# Patient Record
Sex: MALE | Race: WHITE | NOT HISPANIC OR LATINO | Employment: FULL TIME | URBAN - METROPOLITAN AREA
[De-identification: names, ages, dates, MRNs, and addresses within clinical notes are randomized per-mention and may not be internally consistent; named-entity substitution may affect disease eponyms.]

---
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
## 2021-01-22 ENCOUNTER — OFFICE VISIT (OUTPATIENT)
Dept: URGENT CARE | Facility: CLINIC | Age: 21
End: 2021-01-22
Payer: OTHER GOVERNMENT

## 2021-01-22 ENCOUNTER — HOSPITAL ENCOUNTER (OUTPATIENT)
Facility: MEDICAL CENTER | Age: 21
End: 2021-01-22
Attending: NURSE PRACTITIONER
Payer: COMMERCIAL

## 2021-01-22 VITALS
HEART RATE: 90 BPM | RESPIRATION RATE: 16 BRPM | BODY MASS INDEX: 23.16 KG/M2 | SYSTOLIC BLOOD PRESSURE: 114 MMHG | OXYGEN SATURATION: 96 % | TEMPERATURE: 98.1 F | WEIGHT: 139 LBS | DIASTOLIC BLOOD PRESSURE: 70 MMHG | HEIGHT: 65 IN

## 2021-01-22 DIAGNOSIS — J02.9 PHARYNGITIS, UNSPECIFIED ETIOLOGY: ICD-10-CM

## 2021-01-22 DIAGNOSIS — J02.0 STREP PHARYNGITIS: ICD-10-CM

## 2021-01-22 DIAGNOSIS — Z20.822 SUSPECTED COVID-19 VIRUS INFECTION: ICD-10-CM

## 2021-01-22 LAB
INT CON NEG: NORMAL
INT CON POS: NORMAL
S PYO AG THROAT QL: POSITIVE

## 2021-01-22 PROCEDURE — U0003 INFECTIOUS AGENT DETECTION BY NUCLEIC ACID (DNA OR RNA); SEVERE ACUTE RESPIRATORY SYNDROME CORONAVIRUS 2 (SARS-COV-2) (CORONAVIRUS DISEASE [COVID-19]), AMPLIFIED PROBE TECHNIQUE, MAKING USE OF HIGH THROUGHPUT TECHNOLOGIES AS DESCRIBED BY CMS-2020-01-R: HCPCS

## 2021-01-22 PROCEDURE — 87880 STREP A ASSAY W/OPTIC: CPT | Performed by: NURSE PRACTITIONER

## 2021-01-22 PROCEDURE — U0005 INFEC AGEN DETEC AMPLI PROBE: HCPCS

## 2021-01-22 PROCEDURE — 99214 OFFICE O/P EST MOD 30 MIN: CPT | Performed by: NURSE PRACTITIONER

## 2021-01-22 RX ORDER — AMOXICILLIN 500 MG/1
500 CAPSULE ORAL 2 TIMES DAILY
Qty: 20 CAP | Refills: 0 | Status: SHIPPED | OUTPATIENT
Start: 2021-01-22 | End: 2021-02-01

## 2021-01-22 ASSESSMENT — ENCOUNTER SYMPTOMS
HEADACHES: 1
VOMITING: 0
NAUSEA: 0
TROUBLE SWALLOWING: 0
SHORTNESS OF BREATH: 1
FEVER: 0
DIARRHEA: 1
ABDOMINAL PAIN: 0
CHILLS: 1
SORE THROAT: 1
MYALGIAS: 1
COUGH: 1

## 2021-01-22 NOTE — PROGRESS NOTES
Subjective:     Gene Villanueva is a 20 y.o. male who presents for Pharyngitis (x 5 days, sore throat, post nasal drip and chills)      Pharyngitis   This is a new problem. The current episode started in the past 7 days (5 days ago he developed a sore throat, chills and headache.). The problem has been unchanged. Neither side of throat is experiencing more pain than the other. There has been no fever. The pain is at a severity of 3/10. Associated symptoms include congestion, coughing, diarrhea, headaches and shortness of breath. Pertinent negatives include no abdominal pain, ear pain, trouble swallowing or vomiting. Treatments tried: Marijuana. The treatment provided moderate relief.       Review of Systems   Constitutional: Positive for chills and malaise/fatigue. Negative for fever.   HENT: Positive for congestion and sore throat. Negative for ear pain and trouble swallowing.    Respiratory: Positive for cough and shortness of breath.    Gastrointestinal: Positive for diarrhea. Negative for abdominal pain, nausea and vomiting.   Musculoskeletal: Positive for myalgias.   Neurological: Positive for headaches.       PMH:   Past Medical History:   Diagnosis Date   • Infectious disease 2/2014    Osteomyelitis of right shoulder     ALLERGIES:   Allergies   Allergen Reactions   • Other Environmental      seasonal     SURGHX:   Past Surgical History:   Procedure Laterality Date   • EXTENSOR TENDON REPAIR  4/4/2014    Performed by Vitor Castro M.D. at [facility]     SOCHX:   Social History     Socioeconomic History   • Marital status: Single     Spouse name: Not on file   • Number of children: Not on file   • Years of education: Not on file   • Highest education level: Not on file   Occupational History   • Not on file   Social Needs   • Financial resource strain: Not on file   • Food insecurity     Worry: Not on file     Inability: Not on file   • Transportation needs     Medical: Not on file     Non-medical:  "Not on file   Tobacco Use   • Smoking status: Never Smoker   • Smokeless tobacco: Never Used   Substance and Sexual Activity   • Alcohol use: Not on file   • Drug use: Not on file   • Sexual activity: Not on file   Lifestyle   • Physical activity     Days per week: Not on file     Minutes per session: Not on file   • Stress: Not on file   Relationships   • Social connections     Talks on phone: Not on file     Gets together: Not on file     Attends [employer] service: Not on file     Active member of club or organization: Not on file     Attends meetings of clubs or organizations: Not on file     Relationship status: Not on file   • Intimate partner violence     Fear of current or ex partner: Not on file     Emotionally abused: Not on file     Physically abused: Not on file     Forced sexual activity: Not on file   Other Topics Concern   • Not on file   Social History Narrative   • Not on file     FH: No family history on file.      Objective:   /70 (BP Location: Left arm, Patient Position: Sitting, BP Cuff Size: Adult)   Pulse 90   Temp 36.7 °C (98.1 °F) (Temporal)   Resp 16   Ht 1.651 m (5' 5\")   Wt 63 kg (139 lb)   SpO2 96%   BMI 23.13 kg/m²     Physical Exam  Vitals signs and nursing note reviewed.   Constitutional:       General: He is not in acute distress.     Appearance: Normal appearance. He is normal weight. He is ill-appearing. He is not toxic-appearing.   HENT:      Head: Normocephalic and atraumatic.      Right Ear: Tympanic membrane, ear canal and external ear normal. There is no impacted cerumen.      Left Ear: Tympanic membrane, ear canal and external ear normal. There is no impacted cerumen.      Nose: No congestion or rhinorrhea.      Mouth/Throat:      Mouth: Mucous membranes are moist.      Pharynx: Posterior oropharyngeal erythema present. No oropharyngeal exudate.   Eyes:      General:         Right eye: No discharge.         Left eye: No discharge.      Extraocular Movements: " Extraocular movements intact.      Pupils: Pupils are equal, round, and reactive to light.   Neck:      Musculoskeletal: Normal range of motion and neck supple. Muscular tenderness present. No neck rigidity.      Vascular: No carotid bruit.   Cardiovascular:      Rate and Rhythm: Normal rate and regular rhythm.      Pulses: Normal pulses.      Heart sounds: Normal heart sounds.   Pulmonary:      Effort: Pulmonary effort is normal. No respiratory distress.      Breath sounds: Normal breath sounds. No stridor. No wheezing, rhonchi or rales.   Chest:      Chest wall: No tenderness.   Abdominal:      General: Abdomen is flat. Bowel sounds are normal.      Palpations: Abdomen is soft.      Tenderness: There is no abdominal tenderness. There is no right CVA tenderness or left CVA tenderness.   Musculoskeletal: Normal range of motion.   Lymphadenopathy:      Cervical: Cervical adenopathy present.   Skin:     General: Skin is warm and dry.      Capillary Refill: Capillary refill takes less than 2 seconds.   Neurological:      General: No focal deficit present.      Mental Status: He is alert and oriented to person, place, and time. Mental status is at baseline.   Psychiatric:         Mood and Affect: Mood normal.         Behavior: Behavior normal.         Thought Content: Thought content normal.         Judgment: Judgment normal.       POCT strep: positive A  Assessment/Plan:   Assessment    1. Strep pharyngitis  amoxicillin (AMOXIL) 500 MG Cap   2. Suspected COVID-19 virus infection  COVID/SARS CoV-2 PCR   3. Pharyngitis, unspecified etiology  POCT Rapid Strep A   He will be treated for his strep infection with amoxicillin.  Pt was tested for COVID today. I will call with results. Upon entering the room PPE was worn throughout the duration of his visit for both provider and patient. Mask of patient briefly removed for examination of oropharynx. PT was educated to remain in self isolation for at least 10 days from onset of  symptoms followed by an additional 24-hour period of being symptom-free without the use of symptom altering medication.  We discussed supportive measures including humidifier, warm salt water gargles, over-the-counter Cepacol throat lozenges, rest  and increased fluids. Pt was encouraged to seek treatment back in the ER or urgent care for worsening symptoms,  fever greater than 100.5, wheezes or shortness of breath.  Time spent evaluating this patient was at least 30 minutes and includes preparing for visit, counseling/education, exam and evaluation, obtaining history, independent interpretation and ordering labs/tests/procedures.       AVS handout given and reviewed with patient. Pt educated on red flags and when to seek treatment back in ER or UC.

## 2021-01-23 DIAGNOSIS — Z20.822 SUSPECTED COVID-19 VIRUS INFECTION: ICD-10-CM

## 2021-01-23 LAB
COVID ORDER STATUS COVID19: NORMAL
SARS-COV-2 RNA RESP QL NAA+PROBE: NOTDETECTED
SPECIMEN SOURCE: NORMAL

## 2022-01-11 ENCOUNTER — OFFICE VISIT (OUTPATIENT)
Dept: URGENT CARE | Facility: CLINIC | Age: 22
End: 2022-01-11
Payer: COMMERCIAL

## 2022-01-11 ENCOUNTER — HOSPITAL ENCOUNTER (OUTPATIENT)
Facility: MEDICAL CENTER | Age: 22
End: 2022-01-11
Attending: PHYSICIAN ASSISTANT
Payer: COMMERCIAL

## 2022-01-11 VITALS
OXYGEN SATURATION: 98 % | HEIGHT: 68 IN | HEART RATE: 90 BPM | DIASTOLIC BLOOD PRESSURE: 76 MMHG | RESPIRATION RATE: 18 BRPM | BODY MASS INDEX: 18.94 KG/M2 | SYSTOLIC BLOOD PRESSURE: 108 MMHG | TEMPERATURE: 100.1 F | WEIGHT: 125 LBS

## 2022-01-11 DIAGNOSIS — R50.9 FEVER, UNSPECIFIED FEVER CAUSE: ICD-10-CM

## 2022-01-11 DIAGNOSIS — Z20.822 SUSPECTED COVID-19 VIRUS INFECTION: ICD-10-CM

## 2022-01-11 PROCEDURE — 99213 OFFICE O/P EST LOW 20 MIN: CPT | Performed by: PHYSICIAN ASSISTANT

## 2022-01-11 PROCEDURE — 0240U HCHG SARS-COV-2 COVID-19 NFCT DS RESP RNA 3 TRGT MIC: CPT

## 2022-01-11 ASSESSMENT — ENCOUNTER SYMPTOMS
SORE THROAT: 1
EYE REDNESS: 0
NECK PAIN: 0
COUGH: 0
VOMITING: 0
CHILLS: 1
BODY ACHES: 1
EYE DISCHARGE: 0
WHEEZING: 0
DIARRHEA: 0
DIZZINESS: 0
MYALGIAS: 1
HEADACHES: 1
FEVER: 1

## 2022-01-11 NOTE — PROGRESS NOTES
"Subjective     Gene Villanueva is a 21 y.o. male who presents with Coronavirus Screening (would like a covid-19 test ), Fatigue (x2 days, extremely tired ), and Headache              Patient is a 21-year-old male who presents to urgent care with body aches, fatigue, fevers, headache for the last 2 to 3 days.  Patient does report a mild sore throat at this time however this is minimal.  Patient denies any COVID-19 exposure that he is aware of.  He denies COVID-19 vaccination as well.  Patient did have improvement of body aches along with headache with ibuprofen along with Tylenol last night.    Headache   Associated symptoms include a fever and a sore throat. Pertinent negatives include no coughing, dizziness, ear pain, eye redness, neck pain or vomiting.   Generalized Body Aches  This is a new problem. Episode onset: 2-3 days ago. The problem occurs constantly. The problem has been waxing and waning. Associated symptoms include chills, congestion, a fever, headaches, myalgias and a sore throat. Pertinent negatives include no coughing, neck pain, rash or vomiting. Nothing aggravates the symptoms. He has tried acetaminophen and NSAIDs for the symptoms. The treatment provided mild relief.       Review of Systems   Constitutional: Positive for chills, fever and malaise/fatigue.   HENT: Positive for congestion and sore throat. Negative for ear discharge and ear pain.    Eyes: Negative for discharge and redness.   Respiratory: Negative for cough and wheezing.    Gastrointestinal: Negative for diarrhea and vomiting.   Genitourinary: Negative for dysuria and urgency.   Musculoskeletal: Positive for myalgias. Negative for neck pain.   Skin: Negative for itching and rash.   Neurological: Positive for headaches. Negative for dizziness.              Objective     /76   Pulse 90   Temp 37.8 °C (100.1 °F) (Temporal)   Resp 18   Ht 1.727 m (5' 8\")   Wt 56.7 kg (125 lb)   SpO2 98%   BMI 19.01 kg/m²    PMH:  has a past " medical history of Infectious disease (2/2014).  MEDS: Reviewed .   ALLERGIES:   Allergies   Allergen Reactions   • Other Environmental      seasonal     SURGHX:   Past Surgical History:   Procedure Laterality Date   • EXTENSOR TENDON REPAIR  4/4/2014    Performed by Vitor Castro M.D. at [facility]     SOCHX:  reports that he has never smoked. He has never used smokeless tobacco. He reports current alcohol use. He reports current drug use. Drugs: Marijuana and Inhaled.  FH: Family history was reviewed, no pertinent findings to report    Physical Exam  Vitals reviewed.   Constitutional:       General: He is not in acute distress.     Appearance: He is well-developed.   HENT:      Head: Normocephalic and atraumatic.      Right Ear: External ear normal.      Left Ear: External ear normal.   Eyes:      Conjunctiva/sclera: Conjunctivae normal.      Pupils: Pupils are equal, round, and reactive to light.   Neck:      Trachea: No tracheal deviation.   Cardiovascular:      Rate and Rhythm: Normal rate.   Pulmonary:      Effort: Pulmonary effort is normal.   Musculoskeletal:         General: Normal range of motion.      Cervical back: Normal range of motion and neck supple.   Skin:     General: Skin is warm.      Findings: No rash.      Comments: No rash to area exposed during the visit today.    Neurological:      Mental Status: He is alert and oriented to person, place, and time.      Coordination: Coordination normal.   Psychiatric:         Behavior: Behavior normal.         Thought Content: Thought content normal.         Judgment: Judgment normal.                             Assessment & Plan        1. Suspected COVID-19 virus infection  - CoV-2 and Flu A/B by PCR (Cepheid); Future    2. Fever, unspecified fever cause            Appropriate PPE worn at all times by provider.   Pt. Had face mask on throughout entirety of the visit other than oropharyngeal examination today.     Testing performed for  COVID-19.  This patient is with noted fevers as well as send off for influenza PCR as well.  Discussed continuation of antipyretic medication.  No evidence of secondary bacterial infection.  Patient currently without indication of need for higher level of care.    Reviewed with patient/guardian that if they do test positive they will be contacted by their local health department regarding return to work/school protocols.  Results will also be [facility] or called to the patient/guardian directly. Discussed isolation/quarantine recommendations.  Encouraged mask use, frequent handwashing, wiping down hard surfaces, etc.    Patient and/or guardian given precautionary s/sx that mandate immediate follow up and evaluation in the ED. Advised of risks of not doing so.  Side effects of OTC or prescribed medications discussed.   DDX, Supportive care, and indications for immediate follow-up discussed.  Instructed to return to clinic or nearest emergency department if we are not available for any change in condition, further concerns, or worsening of symptoms.    The patient and/or guardian demonstrated a good understanding and agreed with the treatment plan.    Please note that this dictation was created using voice recognition software. I have made every reasonable attempt to correct obvious errors, but I expect that there are errors of grammar and possibly content that I did not discover before finalizing the note.

## 2022-01-12 LAB
FLUAV RNA SPEC QL NAA+PROBE: NEGATIVE
FLUBV RNA SPEC QL NAA+PROBE: NEGATIVE
SARS-COV-2 RNA RESP QL NAA+PROBE: DETECTED
SPECIMEN SOURCE: ABNORMAL

## 2022-01-13 ENCOUNTER — TELEPHONE (OUTPATIENT)
Dept: URGENT CARE | Facility: CLINIC | Age: 22
End: 2022-01-13

## 2022-01-13 NOTE — TELEPHONE ENCOUNTER
Called pt to inform of POSITIVE COVID, clarified current CDC guidelines. He had no further questions at this time.

## 2022-05-13 ENCOUNTER — OFFICE VISIT (OUTPATIENT)
Dept: URGENT CARE | Facility: CLINIC | Age: 22
End: 2022-05-13
Payer: COMMERCIAL

## 2022-05-13 VITALS
TEMPERATURE: 98.2 F | BODY MASS INDEX: 21.04 KG/M2 | HEART RATE: 99 BPM | HEIGHT: 68 IN | SYSTOLIC BLOOD PRESSURE: 110 MMHG | OXYGEN SATURATION: 97 % | WEIGHT: 138.8 LBS | RESPIRATION RATE: 16 BRPM | DIASTOLIC BLOOD PRESSURE: 80 MMHG

## 2022-05-13 DIAGNOSIS — K52.9 GASTROENTERITIS: ICD-10-CM

## 2022-05-13 DIAGNOSIS — R68.89 FLU-LIKE SYMPTOMS: ICD-10-CM

## 2022-05-13 LAB
FLUAV+FLUBV AG SPEC QL IA: NEGATIVE
INT CON NEG: NEGATIVE
INT CON POS: POSITIVE

## 2022-05-13 PROCEDURE — 99213 OFFICE O/P EST LOW 20 MIN: CPT | Performed by: PHYSICIAN ASSISTANT

## 2022-05-13 PROCEDURE — 87804 INFLUENZA ASSAY W/OPTIC: CPT | Performed by: PHYSICIAN ASSISTANT

## 2022-05-13 RX ORDER — ONDANSETRON 4 MG/1
4 TABLET, ORALLY DISINTEGRATING ORAL EVERY 8 HOURS PRN
Qty: 10 TABLET | Refills: 0 | Status: SHIPPED
Start: 2022-05-13 | End: 2022-07-19

## 2022-05-13 ASSESSMENT — ENCOUNTER SYMPTOMS
DIARRHEA: 1
BLOOD IN STOOL: 0
MYALGIAS: 1
ABDOMINAL PAIN: 1
VOMITING: 1
CONSTIPATION: 0
FEVER: 0
CHILLS: 1
NAUSEA: 1

## 2022-05-13 NOTE — LETTER
May 16, 2022         Patient: Gene Villanueva   YOB: 2000   Date of Visit: 5/13/2022           To Whom it May Concern:    Gene Villanueva was seen in my clinic on 5/13/2022. Please excuse patient's absence.     If you have any questions or concerns, please don't hesitate to call.        Sincerely,           New Myles P.A.-C.  Electronically Signed

## 2022-05-13 NOTE — PROGRESS NOTES
"  Subjective:   Gene Villanueva is a 21 y.o. male who presents today with   Chief Complaint   Patient presents with   • Vomiting     Started this morning x 5 times   • Diarrhea     Started today x 3    • Chills            Vomiting  This is a new problem. The current episode started today. The problem occurs constantly. The problem has been gradually improving. Associated symptoms include abdominal pain, chills, myalgias, nausea and vomiting. Pertinent negatives include no fever. Associated symptoms comments: Diarrhea. He has tried acetaminophen for the symptoms. The treatment provided mild relief.   Symptoms just started today.  Patient states he did eat a peanut butter protein shake around 1 AM and believes this could have caused the symptoms as well.  PMH:  has a past medical history of Infectious disease (2/2014).  MEDS: No current outpatient medications on file.  ALLERGIES:   Allergies   Allergen Reactions   • Other Environmental      seasonal     SURGHX:   Past Surgical History:   Procedure Laterality Date   • EXTENSOR TENDON REPAIR  4/4/2014    Performed by Vitor Castro M.D. at [facility]     SOCHX:  reports that he has been smoking. He has never used smokeless tobacco. He reports current alcohol use. He reports current drug use. Frequency: 21.00 times per week. Drugs: Marijuana and Inhaled.  FH: Reviewed with patient, not pertinent to this visit.       Review of Systems   Constitutional: Positive for chills and malaise/fatigue. Negative for fever.   Gastrointestinal: Positive for abdominal pain, diarrhea, nausea and vomiting. Negative for blood in stool, constipation and melena.   Musculoskeletal: Positive for myalgias.        Objective:   /80   Pulse 99   Temp 36.8 °C (98.2 °F) (Temporal)   Resp 16   Ht 1.727 m (5' 8\")   Wt 63 kg (138 lb 12.8 oz)   SpO2 97%   BMI 21.10 kg/m²   Physical Exam  Vitals and nursing note reviewed.   Constitutional:       General: He is not in acute " distress.     Appearance: Normal appearance. He is well-developed. He is not ill-appearing or toxic-appearing.   HENT:      Head: Normocephalic and atraumatic.      Right Ear: Hearing normal.      Left Ear: Hearing normal.      Mouth/Throat:      Pharynx: No oropharyngeal exudate or posterior oropharyngeal erythema.   Cardiovascular:      Rate and Rhythm: Normal rate and regular rhythm.      Heart sounds: Normal heart sounds.   Pulmonary:      Effort: Pulmonary effort is normal.      Breath sounds: Normal breath sounds. No stridor. No wheezing, rhonchi or rales.   Abdominal:      General: Bowel sounds are normal.      Tenderness: There is generalized abdominal tenderness.   Musculoskeletal:      Comments: Normal movement in all 4 extremities   Skin:     General: Skin is warm and dry.   Neurological:      Mental Status: He is alert.      Coordination: Coordination normal.   Psychiatric:         Mood and Affect: Mood normal.       FLU Negative    Assessment/Plan:   Assessment    1. Gastroenteritis    2. Flu-like symptoms  - POCT Influenza A/B  - SARS-CoV-2 PCR (24 hour In-House): Collect NP swab in [facility]; Future  Symptoms and presentation are consistent with flulike symptoms versus gastroenteritis.  We will rule out flu today and recommend waiting to test for COVID until tomorrow or Sunday given early onset of symptoms and he is understanding of this and will return to another urgent care for COVID swab at that time.  [place] diet, increase water intake sips at a time. Patient encouraged to get plenty of rest, use OTC tylenol for pain/fever, and drink plenty of fluids.    Differential diagnosis, natural history, supportive care, and indications for immediate follow-up discussed.   Patient given instructions and understanding of medications and treatment.    If not improving in 3-5 days, F/U with PCP or return to  if symptoms worsen.    Patient agreeable to plan.      Please note that this dictation was created using  voice recognition software. I have made every reasonable attempt to correct obvious errors, but I expect that there are errors of grammar and possibly content that I did not discover before finalizing the note.    New Myles PA-C

## 2022-07-18 ENCOUNTER — OFFICE VISIT (OUTPATIENT)
Dept: URGENT CARE | Facility: CLINIC | Age: 22
End: 2022-07-18
Payer: COMMERCIAL

## 2022-07-18 VITALS
DIASTOLIC BLOOD PRESSURE: 68 MMHG | HEIGHT: 68 IN | SYSTOLIC BLOOD PRESSURE: 108 MMHG | WEIGHT: 140 LBS | RESPIRATION RATE: 16 BRPM | TEMPERATURE: 99 F | OXYGEN SATURATION: 95 % | BODY MASS INDEX: 21.22 KG/M2 | HEART RATE: 78 BPM

## 2022-07-18 DIAGNOSIS — R11.2 NON-INTRACTABLE VOMITING WITH NAUSEA, UNSPECIFIED VOMITING TYPE: ICD-10-CM

## 2022-07-18 PROCEDURE — 99213 OFFICE O/P EST LOW 20 MIN: CPT | Performed by: PHYSICIAN ASSISTANT

## 2022-07-18 RX ORDER — ONDANSETRON 4 MG/1
4 TABLET, FILM COATED ORAL EVERY 6 HOURS PRN
Qty: 20 TABLET | Refills: 1 | Status: SHIPPED
Start: 2022-07-18 | End: 2022-08-10

## 2022-07-18 ASSESSMENT — ENCOUNTER SYMPTOMS
VOMITING: 0
FEVER: 0
ABDOMINAL PAIN: 0
CHILLS: 0
DIARRHEA: 0
NAUSEA: 0

## 2022-07-18 NOTE — LETTER
July 18, 2022       Patient: Gene Villanueva   YOB: 2000   Date of Visit: 7/18/2022         To Whom It May Concern:    In my medical opinion, I recommend that Gene Villanueva should be excused from work for today, 7/18/2022.      If you have any questions or concerns, please don't hesitate to call [phone]          Sincerely,          Claude Duran P.A.-C.  Electronically Signed

## 2022-07-19 ENCOUNTER — TELEPHONE (OUTPATIENT)
Dept: SCHEDULING | Facility: IMAGING CENTER | Age: 22
End: 2022-07-19

## 2022-07-19 NOTE — PROGRESS NOTES
Subjective:   Gene Villanueva  is a 21 y.o. male who presents for Diarrhea (Diarrhea and vomiting, stomach felt uneasy over the weekend. Vomited and diarrhea Thursday night, uncontrolled. Felt nausea and lack of appetite. Thought it was food poisoning. Back hurts too. Tx: Zofran. )      Diarrhea   This is a new problem. The current episode started in the past 7 days. Pertinent negatives include no abdominal pain, chills, fever ( resolved) or vomiting ( resolved).   Patient presents urgent care noting episodes of waxing waning vomiting over the last few months particularly this last 2 weeks.  He notes last week he felt sick after smoking marijuana at a friend's house and started violently vomiting on the drive home.  He states he denies any other likely triggers for why that might of happened.  Denies others with similar symptoms or food sources of concern.  Denies recent antibiotics or travel.  Denies preparing water from outdoor sources.  Denies abdominal pain but complains of cramping.  States he did have significant vomiting, approximates 20 times over the day Friday that did resolve thereafter.  He felt fine Saturday and Sunday and then again Monday morning awoke with a similar episode.  He did have Zofran from when a similar constellation of symptoms occurred in May, 2 months ago and the antiemetic medication was significantly helpful for the patient through today.  He requests work note excusing him from missed work today.  Denies fevers or chills currently but did have subjective fevers and chills last Thursday night.  Notes abrupt onset and resolution of symptoms with each episode and correlating with cannabis use.    Review of Systems   Constitutional: Negative for chills and fever ( resolved).   Gastrointestinal: Negative for abdominal pain, diarrhea, nausea and vomiting ( resolved).   Genitourinary: Negative.    Skin: Negative for rash.       Allergies   Allergen Reactions   • Other Environmental      seasonal  "       Objective:   /68 (BP Location: Left arm, Patient Position: Sitting, BP Cuff Size: Adult)   Pulse 78   Temp 37.2 °C (99 °F) (Temporal)   Resp 16   Ht 1.727 m (5' 8\")   Wt 63.5 kg (140 lb)   SpO2 95%   BMI 21.29 kg/m²     Physical Exam  Vitals and nursing note reviewed.   Constitutional:       General: He is not in acute distress.     Appearance: He is well-developed. He is not diaphoretic.   HENT:      Head: Normocephalic and atraumatic.      Right Ear: External ear normal.      Left Ear: External ear normal.      Nose: Nose normal.   Eyes:      General: No scleral icterus.        Right eye: No discharge.         Left eye: No discharge.      Conjunctiva/sclera: Conjunctivae normal.   Pulmonary:      Effort: Pulmonary effort is normal. No respiratory distress.      Breath sounds: Normal breath sounds.   Abdominal:      General: Abdomen is flat. Bowel sounds are normal. There is no distension.      Palpations: Abdomen is soft.      Tenderness: There is no abdominal tenderness. There is no right CVA tenderness, left CVA tenderness, guarding or rebound. Negative signs include Miller's sign and McBurney's sign.   Musculoskeletal:         General: Normal range of motion.      Cervical back: Neck supple.   Skin:     General: Skin is warm and dry.      Coloration: Skin is not pale.   Neurological:      Mental Status: He is alert and oriented to person, place, and time.      Coordination: Coordination normal.       Declined Zofran, no nausea at this time    Assessment/Plan:   1. Non-intractable vomiting with nausea, unspecified vomiting type  - Referral to establish with [facility] PCP  - ondansetron (ZOFRAN) 4 MG Tab tablet; Take 1 Tablet by mouth every 6 hours as needed for Nausea/Vomiting.  Dispense: 20 Tablet; Refill: 1    Due to atypical patterns of abrupt onset of vomiting with fairly severe forceful cyclic vomiting then abrupt cessation repeating associated with cannabis use I do suspect cannabis " hyperemesis syndrome, patient is sent with information on this topic.  Referral was placed for PCP establish.  With intractable vomiting patient is directed to ER for fluid resuscitation and further treatment.  Patient is comfortable at this time requesting a work note for previously missed days.  Follow-up with PCP  Return to clinic with lack of resolution or progression of symptoms.  ER precautions with any worsening symptoms are reviewed with patient/caregiver and they do express understanding    I have worn an N95 mask, gloves and eye protection for the entire encounter with this patient.     Differential diagnosis, natural history, supportive care, and indications for immediate follow-up discussed.

## 2022-08-10 ENCOUNTER — OFFICE VISIT (OUTPATIENT)
Dept: MEDICAL GROUP | Facility: PHYSICIAN GROUP | Age: 22
End: 2022-08-10
Attending: PHYSICIAN ASSISTANT
Payer: COMMERCIAL

## 2022-08-10 VITALS
HEIGHT: 68 IN | SYSTOLIC BLOOD PRESSURE: 90 MMHG | HEART RATE: 92 BPM | TEMPERATURE: 97.8 F | OXYGEN SATURATION: 97 % | BODY MASS INDEX: 22.52 KG/M2 | WEIGHT: 148.6 LBS | DIASTOLIC BLOOD PRESSURE: 40 MMHG

## 2022-08-10 DIAGNOSIS — H61.23 BILATERAL IMPACTED CERUMEN: ICD-10-CM

## 2022-08-10 DIAGNOSIS — F32.9 REACTIVE DEPRESSION: ICD-10-CM

## 2022-08-10 DIAGNOSIS — Z23 NEED FOR VACCINATION: ICD-10-CM

## 2022-08-10 PROBLEM — F32.A DEPRESSION: Status: ACTIVE | Noted: 2022-08-10

## 2022-08-10 PROCEDURE — 90621 MENB-FHBP VACC 2/3 DOSE IM: CPT

## 2022-08-10 PROCEDURE — 90651 9VHPV VACCINE 2/3 DOSE IM: CPT

## 2022-08-10 PROCEDURE — 99213 OFFICE O/P EST LOW 20 MIN: CPT | Mod: 25

## 2022-08-10 PROCEDURE — 90471 IMMUNIZATION ADMIN: CPT

## 2022-08-10 PROCEDURE — 90472 IMMUNIZATION ADMIN EACH ADD: CPT

## 2022-08-10 PROCEDURE — 69210 REMOVE IMPACTED EAR WAX UNI: CPT

## 2022-08-10 ASSESSMENT — PATIENT HEALTH QUESTIONNAIRE - PHQ9
5. POOR APPETITE OR OVEREATING: 3 - NEARLY EVERY DAY
CLINICAL INTERPRETATION OF PHQ2 SCORE: 2
SUM OF ALL RESPONSES TO PHQ QUESTIONS 1-9: 11

## 2022-08-10 NOTE — ASSESSMENT & PLAN NOTE
Procedure: Cerumen Removal  Risks and benefits of procedure discussed with patient.  Cerumen removed with lavage by the MA. Patient tolerated the procedure well    Post-lavage curette was performed by SOLA Hillman. Post procedure exam with clear canal and normal TM on left.  However, right side remains partially impacted with cerumen.    Pt educated about proper care of ear canal. Q-tip cleaning discouraged, use of debrox and warm water lavage discussed.  Patient will purchase earwax removal kit and work on softening impacted cerumen in right ear and return for follow-up if needed

## 2022-08-10 NOTE — ASSESSMENT & PLAN NOTE
Chronic condition currently active  - Recommend referral to behavioral health when ready  - Recommend stopping mind altering substances such as alcohol and marijuana  - ED precautions given and known for worsening or progression of this condition including any SI

## 2022-08-10 NOTE — PROGRESS NOTES
"Subjective:     CC:  Diagnoses of Reactive depression, Need for vaccination, and Bilateral impacted cerumen were pertinent to this visit.    HISTORY OF THE PRESENT ILLNESS: Patient is a 21 y.o. male. This pleasant patient is here today to establish care and discuss the following problems:    Problem   Depression    PHQ-9 today in clinic is 11.  Patient denies suicidal ideations.  He reports to use substances which she feels may be contributing to this condition such as alcohol and marijuana.  He does not use these to excess but feels daily use could be a contributing factor.  Does not want referral to behavioral health at this time or medication management.  He would like to try and work on habits himself and will reach out for help if need be.     Bilateral Impacted Cerumen    Upon physical exam it was discovered patient has bilateral cerumen impaction.  He is requesting removal of wax at today's visit.     Nontraumatic Rupture of Extensor Tendons of Hand and Wrist (Resolved)   Osteomyelitis (HCC) (Resolved)       Health Maintenance: Completed, Covid shot at outside pharmacy    ROS:   ROS      Objective:     Exam: BP (!) 90/40 (BP Location: Left arm, Patient Position: Sitting, BP Cuff Size: Adult)   Pulse 92   Temp 36.6 °C (97.8 °F) (Temporal)   Ht 1.727 m (5' 8\")   Wt 67.4 kg (148 lb 9.6 oz)   SpO2 97%  Body mass index is 22.59 kg/m².    Physical Exam      Labs: None    Assessment & Plan: Medical Decision Making   21 y.o. male with the following -    Problem List Items Addressed This Visit       Depression    Relevant Orders    Patient has been identified as having a positive depression screening. Appropriate orders and counseling have been given. (Completed)    Bilateral impacted cerumen     Procedure: Cerumen Removal  Risks and benefits of procedure discussed with patient.  Cerumen removed with lavage by the MA. Patient tolerated the procedure well    Post-lavage curette was performed by SOLA Hillman. " Post procedure exam with clear canal and normal TM.    Pt educated about proper care of ear canal. Q-tip cleaning discouraged, use of debrox and warm water lavage discussed.                 Relevant Orders    Ear Cerumen Removal     Other Visit Diagnoses       Need for vaccination        Relevant Orders    9VHPV Vaccine 2-3 Dose (GARDASIL 9) (Completed)    Meningococcal Vaccine Serogroup B 2-3 Dose (TRUMENBA)            Differential diagnosis, natural history, supportive care, and indications for immediate follow-up discussed.  Shared decision making approach was utilized, and patient is amendable with plan of care.  Patient understands to return to clinic or go to the emergency department if symptoms worsen. All questions and concerns addressed.      Return in about 6 months (around 2/10/2023) for Health Maintanence.    Please note that this dictation was created using voice recognition software. I have made every reasonable attempt to correct obvious errors, but I expect that there are errors of grammar and possibly content that I did not discover before finalizing the note.

## 2022-11-13 ENCOUNTER — OFFICE VISIT (OUTPATIENT)
Dept: URGENT CARE | Facility: CLINIC | Age: 22
End: 2022-11-13
Payer: COMMERCIAL

## 2022-11-13 VITALS
DIASTOLIC BLOOD PRESSURE: 50 MMHG | SYSTOLIC BLOOD PRESSURE: 112 MMHG | HEART RATE: 111 BPM | TEMPERATURE: 101.7 F | HEIGHT: 68 IN | RESPIRATION RATE: 16 BRPM | WEIGHT: 150 LBS | BODY MASS INDEX: 22.73 KG/M2 | OXYGEN SATURATION: 97 %

## 2022-11-13 DIAGNOSIS — J06.9 VIRAL URI WITH COUGH: ICD-10-CM

## 2022-11-13 DIAGNOSIS — R50.9 FEVER, UNSPECIFIED FEVER CAUSE: ICD-10-CM

## 2022-11-13 DIAGNOSIS — J02.9 SORE THROAT: ICD-10-CM

## 2022-11-13 LAB
EXTERNAL QUALITY CONTROL: NORMAL
FLUAV+FLUBV AG SPEC QL IA: NEGATIVE
INT CON NEG: NORMAL
INT CON POS: NORMAL
S PYO AG THROAT QL: NEGATIVE
SARS-COV+SARS-COV-2 AG RESP QL IA.RAPID: NEGATIVE

## 2022-11-13 PROCEDURE — 99213 OFFICE O/P EST LOW 20 MIN: CPT | Performed by: NURSE PRACTITIONER

## 2022-11-13 PROCEDURE — 87804 INFLUENZA ASSAY W/OPTIC: CPT | Performed by: NURSE PRACTITIONER

## 2022-11-13 PROCEDURE — 87880 STREP A ASSAY W/OPTIC: CPT | Performed by: NURSE PRACTITIONER

## 2022-11-13 PROCEDURE — 87426 SARSCOV CORONAVIRUS AG IA: CPT | Performed by: NURSE PRACTITIONER

## 2022-11-13 ASSESSMENT — ENCOUNTER SYMPTOMS
MYALGIAS: 1
FEVER: 1
DIARRHEA: 1
SORE THROAT: 1
SWOLLEN GLANDS: 1

## 2022-11-13 NOTE — PROGRESS NOTES
Subjective     Gene Villanueva is a 21 y.o. male who presents with Pharyngitis (Sx began Thursday , body aches , slight fever )            Pharyngitis   This is a new problem. Episode onset: pt reports new onset of body aches 3 days ago. he reports new onset of back pain, diarrhea, ST and fever that started yesterday. Associated symptoms include diarrhea and swollen glands. He has tried NSAIDs (rest) for the symptoms. The treatment provided mild relief.     Review of Systems   Constitutional:  Positive for fever.   HENT:  Positive for sore throat.    Gastrointestinal:  Positive for diarrhea.   Musculoskeletal:  Positive for myalgias.   All other systems reviewed and are negative.       Past Medical History:   Diagnosis Date    Infectious disease 2/2014    Osteomyelitis of right shoulder    Nontraumatic rupture of extensor tendons of hand and wrist 4/4/2014    Osteomyelitis (HCC) 2/23/2014      Past Surgical History:   Procedure Laterality Date    EXTENSOR TENDON REPAIR  4/4/2014    Performed by Vitor Castro M.D. at [facility]      Social History     Socioeconomic History    Marital status: Single     Spouse name: Not on file    Number of children: Not on file    Years of education: Not on file    Highest education level: Not on file   Occupational History    Not on file   Tobacco Use    Smoking status: Never    Smokeless tobacco: Never   Vaping Use    Vaping Use: Every day    Substances: Nicotine   Substance and Sexual Activity    Alcohol use: Yes     Alcohol/week: 1.2 oz     Types: 2 Standard drinks or equivalent per week    Drug use: Yes     Frequency: 21.0 times per week     Types: Marijuana, Inhaled    Sexual activity: Not on file   Other Topics Concern    Not on file   Social History Narrative    Not on file     Social Determinants of Health     Financial Resource Strain: Not on file   Food Insecurity: Not on file   Transportation Needs: Not on file   Physical Activity: Not on file   Stress: Not on  "file   Social Connections: Not on file   Intimate Partner Violence: Not on file   Housing Stability: Not on file         Objective     /50 (BP Location: Left arm, Patient Position: Sitting, BP Cuff Size: Adult)   Pulse (!) 111   Temp (!) 38.7 °C (101.7 °F) (Temporal)   Resp 16   Ht 1.727 m (5' 8\")   Wt 68 kg (150 lb)   SpO2 97%   BMI 22.81 kg/m²      Physical Exam  Vitals and nursing note reviewed.   Constitutional:       Appearance: Normal appearance. He is ill-appearing.   HENT:      Head: Normocephalic and atraumatic.      Nose: Nose normal.      Mouth/Throat:      Mouth: Mucous membranes are moist.      Pharynx: Oropharynx is clear.   Eyes:      Extraocular Movements: Extraocular movements intact.      Pupils: Pupils are equal, round, and reactive to light.   Cardiovascular:      Rate and Rhythm: Normal rate and regular rhythm.   Pulmonary:      Effort: Pulmonary effort is normal.   Musculoskeletal:         General: Normal range of motion.      Cervical back: Normal range of motion and neck supple.   Skin:     General: Skin is warm and dry.      Capillary Refill: Capillary refill takes less than 2 seconds.   Neurological:      General: No focal deficit present.      Mental Status: He is alert and oriented to person, place, and time. Mental status is at baseline.   Psychiatric:         Mood and Affect: Mood normal.         Thought Content: Thought content normal.         Judgment: Judgment normal.                           Assessment & Plan        1. Sore throat  - POCT Rapid Strep A NEGATIVE    2. Fever, unspecified fever cause  - POCT Influenza A/B  - POCT SARS-COV Antigen CHARANJIT (Symptomatic only)    3. Viral URI with cough      POC covid and flu- negative  Discussed with patient symptoms are viral in nature, there is low concern for any respiratory infection currently. Antibiotics are not advised at this time.  Warm salt water gargles  Alternate tylenol and ibuprofen for pain  Soft foods and cool " liquids  Throat lozenges as directed  Work note provided  Supportive care, differential diagnoses, and indications for immediate follow-up discussed with patient.    Pathogenesis of diagnosis discussed including typical length and natural progression.    Instructed to return to  or nearest emergency department if symptoms fail to improve, for any change in condition, further concerns, or new concerning symptoms.  Patient states understanding of the plan of care and discharge instructions.

## 2022-11-13 NOTE — LETTER
November 13, 2022    To Whom It May Concern:         This is confirmation that Gene Villanueva attended his scheduled appointment with KADEN Petit on 11/13/22.    Please excuse him from work 11/13/22-11/14/22.    Sincerely,      AYO Petit.  [phone]

## 2023-04-17 ENCOUNTER — OFFICE VISIT (OUTPATIENT)
Dept: URGENT CARE | Facility: CLINIC | Age: 23
End: 2023-04-17
Payer: COMMERCIAL

## 2023-04-17 VITALS
TEMPERATURE: 98.3 F | HEIGHT: 67 IN | SYSTOLIC BLOOD PRESSURE: 118 MMHG | OXYGEN SATURATION: 95 % | HEART RATE: 88 BPM | BODY MASS INDEX: 23.54 KG/M2 | WEIGHT: 150 LBS | RESPIRATION RATE: 16 BRPM | DIASTOLIC BLOOD PRESSURE: 70 MMHG

## 2023-04-17 DIAGNOSIS — J30.9 ALLERGIC RHINITIS WITH POSTNASAL DRIP: ICD-10-CM

## 2023-04-17 DIAGNOSIS — J02.9 SORE THROAT: ICD-10-CM

## 2023-04-17 DIAGNOSIS — R09.82 ALLERGIC RHINITIS WITH POSTNASAL DRIP: ICD-10-CM

## 2023-04-17 LAB — S PYO DNA SPEC NAA+PROBE: NOT DETECTED

## 2023-04-17 PROCEDURE — 99213 OFFICE O/P EST LOW 20 MIN: CPT | Performed by: PHYSICIAN ASSISTANT

## 2023-04-17 PROCEDURE — 87651 STREP A DNA AMP PROBE: CPT | Performed by: PHYSICIAN ASSISTANT

## 2023-04-17 ASSESSMENT — ENCOUNTER SYMPTOMS
CHILLS: 0
SORE THROAT: 1
HEADACHES: 1
FEVER: 0
COUGH: 1
SINUS PAIN: 1

## 2023-04-17 NOTE — PROGRESS NOTES
"  Subjective:   Gene Villanueva is a 22 y.o. male who presents today with   Chief Complaint   Patient presents with    Sinus Problem     X 4 days, nasal congestion, stuffy and runny nose, headaches, post nasal drip and scratchy throat     Sinus Problem  This is a new problem. Episode onset: 4 days. The problem is unchanged. There has been no fever. Associated symptoms include congestion, coughing, headaches and a sore throat. Pertinent negatives include no chills. Past treatments include nothing. The treatment provided no relief.     PMH:  has a past medical history of Infectious disease (2/2014), Nontraumatic rupture of extensor tendons of hand and wrist (4/4/2014), and Osteomyelitis (HCC) (2/23/2014).  MEDS: No current outpatient medications on file.  ALLERGIES:   Allergies   Allergen Reactions    Other Environmental Runny Nose     Seasonal allergies, dry, or runny nose, itchy and dry eyes     SURGHX:   Past Surgical History:   Procedure Laterality Date    EXTENSOR TENDON REPAIR  4/4/2014    Performed by Vitor Castro M.D. at [facility]     SOCHX:  reports that he has never smoked. He has never used smokeless tobacco. He reports current alcohol use of about 1.2 oz per week. He reports current drug use. Frequency: 21.00 times per week. Drugs: Marijuana and Inhaled.  FH: Reviewed with patient, not pertinent to this visit.       Review of Systems   Constitutional:  Negative for chills and fever.   HENT:  Positive for congestion, sinus pain and sore throat.    Respiratory:  Positive for cough.    Neurological:  Positive for headaches.      Objective:   /70 (BP Location: Left arm, Patient Position: Sitting, BP Cuff Size: Adult)   Pulse 88   Temp 36.8 °C (98.3 °F) (Temporal)   Resp 16   Ht 1.702 m (5' 7\")   Wt 68 kg (150 lb)   SpO2 95%   BMI 23.49 kg/m²   Physical Exam  Vitals and nursing note reviewed.   Constitutional:       General: He is not in acute distress.     Appearance: Normal " appearance. He is well-developed. He is not ill-appearing or toxic-appearing.   HENT:      Head: Normocephalic and atraumatic.      Right Ear: Hearing normal.      Left Ear: Hearing normal.   Cardiovascular:      Rate and Rhythm: Normal rate and regular rhythm.      Heart sounds: Normal heart sounds.   Pulmonary:      Effort: Pulmonary effort is normal.   Musculoskeletal:      Comments: Normal movement in all 4 extremities   Skin:     General: Skin is warm and dry.   Neurological:      Mental Status: He is alert.      Coordination: Coordination normal.   Psychiatric:         Mood and Affect: Mood normal.     STREP A -    Assessment/Plan:   Assessment    1. Sore throat  - POCT GROUP A STREP, PCR    2. Allergic rhinitis with postnasal drip  Symptoms and presentation consistent with allergic rhinitis resulting in postnasal drip causing a sore throat.  Would recommend treatment with over-the-counter Claritin and Flonase per 's instructions.  We will follow-up with strep test and treat accordingly with antibiotics if needed.  No indication for antibiotics at this time given that strep test came back negative.    Differential diagnosis, natural history, supportive care, and indications for immediate follow-up discussed.   Patient given instructions and understanding of medications and treatment.    If not improving in 3-5 days, F/U with PCP or return to  if symptoms worsen.    Patient agreeable to plan.      Please note that this dictation was created using voice recognition software. I have made every reasonable attempt to correct obvious errors, but I expect that there are errors of grammar and possibly content that I did not discover before finalizing the note.    New Myles PA-C

## 2023-10-06 ENCOUNTER — OFFICE VISIT (OUTPATIENT)
Dept: URGENT CARE | Facility: PHYSICIAN GROUP | Age: 23
End: 2023-10-06

## 2023-10-06 VITALS
SYSTOLIC BLOOD PRESSURE: 100 MMHG | HEART RATE: 84 BPM | TEMPERATURE: 97.6 F | OXYGEN SATURATION: 98 % | BODY MASS INDEX: 27.47 KG/M2 | HEIGHT: 67 IN | DIASTOLIC BLOOD PRESSURE: 60 MMHG | RESPIRATION RATE: 16 BRPM | WEIGHT: 175 LBS

## 2023-10-06 DIAGNOSIS — R68.89 FLU-LIKE SYMPTOMS: ICD-10-CM

## 2023-10-06 DIAGNOSIS — H66.002 NON-RECURRENT ACUTE SUPPURATIVE OTITIS MEDIA OF LEFT EAR WITHOUT SPONTANEOUS RUPTURE OF TYMPANIC MEMBRANE: ICD-10-CM

## 2023-10-06 PROCEDURE — 99213 OFFICE O/P EST LOW 20 MIN: CPT | Performed by: NURSE PRACTITIONER

## 2023-10-06 PROCEDURE — 3074F SYST BP LT 130 MM HG: CPT | Performed by: NURSE PRACTITIONER

## 2023-10-06 PROCEDURE — 3078F DIAST BP <80 MM HG: CPT | Performed by: NURSE PRACTITIONER

## 2023-10-06 RX ORDER — AMOXICILLIN 875 MG/1
875 TABLET, COATED ORAL 2 TIMES DAILY
Qty: 14 TABLET | Refills: 0 | Status: SHIPPED | OUTPATIENT
Start: 2023-10-06 | End: 2023-10-13

## 2023-10-12 ASSESSMENT — ENCOUNTER SYMPTOMS
CARDIOVASCULAR NEGATIVE: 1
CHILLS: 1
MYALGIAS: 1
FATIGUE: 1
NEUROLOGICAL NEGATIVE: 1
FEVER: 0
GASTROINTESTINAL NEGATIVE: 1
RESPIRATORY NEGATIVE: 1
EYES NEGATIVE: 1
SORE THROAT: 1
COUGH: 0

## 2023-10-12 NOTE — PROGRESS NOTES
"Subjective:   Gene Villanueva is a 22 y.o. male who presents for Chills (Body aches, sore throat since this morning.)      Chills  This is a new problem. The current episode started today. The problem occurs constantly. The problem has been gradually worsening. Associated symptoms include chills, fatigue, myalgias and a sore throat. Pertinent negatives include no congestion, coughing or fever. Nothing aggravates the symptoms. He has tried NSAIDs for the symptoms. The treatment provided mild relief.       Review of Systems   Constitutional:  Positive for chills, fatigue and malaise/fatigue. Negative for fever.   HENT:  Positive for sore throat. Negative for congestion, ear discharge and ear pain.    Eyes: Negative.    Respiratory: Negative.  Negative for cough.    Cardiovascular: Negative.    Gastrointestinal: Negative.    Genitourinary: Negative.    Musculoskeletal:  Positive for myalgias.   Skin: Negative.    Neurological: Negative.        Medications, Allergies, and current problem list reviewed today in Epic.     Objective:     /60   Pulse 84   Temp 36.4 °C (97.6 °F) (Temporal)   Resp 16   Ht 1.702 m (5' 7\")   Wt 79.4 kg (175 lb)   SpO2 98%     Physical Exam  Vitals reviewed.   Constitutional:       General: He is not in acute distress.     Appearance: Normal appearance. He is normal weight. He is not ill-appearing.   HENT:      Head: Normocephalic and atraumatic.      Right Ear: Tympanic membrane and ear canal normal.      Left Ear: No drainage or tenderness. A middle ear effusion is present. Tympanic membrane is erythematous and bulging.      Nose: Nose normal.      Mouth/Throat:      Mouth: Mucous membranes are moist.      Pharynx: Oropharynx is clear. Uvula midline. Posterior oropharyngeal erythema present. No pharyngeal swelling, oropharyngeal exudate or uvula swelling.   Eyes:      Extraocular Movements: Extraocular movements intact.      Conjunctiva/sclera: Conjunctivae normal.      Pupils: Pupils " are equal, round, and reactive to light.   Cardiovascular:      Rate and Rhythm: Normal rate and regular rhythm.      Pulses: Normal pulses.      Heart sounds: Normal heart sounds.   Pulmonary:      Effort: Pulmonary effort is normal.      Breath sounds: Normal breath sounds.   Abdominal:      General: Abdomen is flat. Bowel sounds are normal.      Palpations: Abdomen is soft.   Musculoskeletal:         General: Normal range of motion.      Cervical back: Normal range of motion and neck supple.   Skin:     General: Skin is warm and dry.      Capillary Refill: Capillary refill takes less than 2 seconds.   Neurological:      General: No focal deficit present.      Mental Status: He is alert and oriented to person, place, and time.   Psychiatric:         Mood and Affect: Mood normal.         Behavior: Behavior normal.         Lab Results/POC Test Results   Results for orders placed or performed in visit on 04/17/23   POCT GROUP A STREP, PCR   Result Value Ref Range    POC Group A Strep, PCR Not Detected Not Detected, Invalid             Assessment/Plan:     Diagnosis and associated orders:     1. Non-recurrent acute suppurative otitis media of left ear without spontaneous rupture of tympanic membrane  amoxicillin (AMOXIL) 875 MG tablet      2. Flu-like symptoms           Comments/MDM:     Provided patient with information on the etiology and pathogenesis of otitis media. Instructed to take antibiotics as prescribed. May give Tylenol/Motrin prn discomfort. May apply warm compress to the ear for prn discomfort. RTC in 2 weeks for reevaluation.  Advised patient his other symptoms are viral in etiology, recommend supportive care. Increased fluids and rest.  Recommend over-the-counter cold and flu medications and Tylenol and/or ibuprofen for symptomatic relief and fevers.  Discussed use of nedi-pot, humidifier, and Flonase nasal spray as well.  Discussed good hand hygiene and ways to decrease spread of disease.  Follow-up  with PCP return for reevaluation if symptoms persist/worsen.  Patient offered discharge instructions regarding viral illness.  The patient demonstrated a good understanding and agreed with the treatment plan.            Differential diagnosis, natural history, supportive care, and indications for immediate follow-up discussed.    Advised the patient to follow-up with the primary care physician for recheck, reevaluation, and consideration of further management.    Please note that this dictation was created using voice recognition software. I have made a reasonable attempt to correct obvious errors, but I expect that there are errors of grammar and possibly content that I did not discover before finalizing the note.    This note was electronically signed by SOLA Feldman

## 2023-10-14 ENCOUNTER — OFFICE VISIT (OUTPATIENT)
Dept: URGENT CARE | Facility: CLINIC | Age: 23
End: 2023-10-14

## 2023-10-14 VITALS
BODY MASS INDEX: 25.74 KG/M2 | SYSTOLIC BLOOD PRESSURE: 104 MMHG | WEIGHT: 164 LBS | DIASTOLIC BLOOD PRESSURE: 62 MMHG | HEART RATE: 95 BPM | HEIGHT: 67 IN | TEMPERATURE: 98.1 F | RESPIRATION RATE: 16 BRPM | OXYGEN SATURATION: 97 %

## 2023-10-14 DIAGNOSIS — R11.2 NAUSEA AND VOMITING, UNSPECIFIED VOMITING TYPE: ICD-10-CM

## 2023-10-14 PROCEDURE — 3074F SYST BP LT 130 MM HG: CPT

## 2023-10-14 PROCEDURE — 99999 PR NO CHARGE: CPT

## 2023-10-14 PROCEDURE — 3078F DIAST BP <80 MM HG: CPT

## 2023-10-18 ENCOUNTER — APPOINTMENT (OUTPATIENT)
Dept: RADIOLOGY | Facility: MEDICAL CENTER | Age: 23
DRG: 441 | End: 2023-10-18
Attending: HOSPITALIST
Payer: COMMERCIAL

## 2023-10-18 ENCOUNTER — HOSPITAL ENCOUNTER (INPATIENT)
Facility: MEDICAL CENTER | Age: 23
LOS: 6 days | DRG: 441 | End: 2023-10-24
Attending: INTERNAL MEDICINE | Admitting: HOSPITALIST
Payer: COMMERCIAL

## 2023-10-18 DIAGNOSIS — R74.01 TRANSAMINITIS: ICD-10-CM

## 2023-10-18 DIAGNOSIS — B17.9 ACUTE HEPATITIS: ICD-10-CM

## 2023-10-18 DIAGNOSIS — R16.1 SPLENOMEGALY: ICD-10-CM

## 2023-10-18 DIAGNOSIS — B16.9 ACUTE HEPATITIS B: ICD-10-CM

## 2023-10-18 PROBLEM — D72.829 LEUKOCYTOSIS: Status: ACTIVE | Noted: 2023-10-18

## 2023-10-18 PROBLEM — K72.00 ACUTE LIVER FAILURE: Status: ACTIVE | Noted: 2023-10-18

## 2023-10-18 LAB
ALBUMIN SERPL BCP-MCNC: 3.9 G/DL (ref 3.2–4.9)
ALBUMIN/GLOB SERPL: 1.1 G/DL
ALP SERPL-CCNC: 604 U/L (ref 30–99)
ALT SERPL-CCNC: 354 U/L (ref 2–50)
ANION GAP SERPL CALC-SCNC: 13 MMOL/L (ref 7–16)
AST SERPL-CCNC: 212 U/L (ref 12–45)
BASOPHILS # BLD AUTO: 0 % (ref 0–1.8)
BASOPHILS # BLD: 0 K/UL (ref 0–0.12)
BILIRUB SERPL-MCNC: 4.7 MG/DL (ref 0.1–1.5)
BUN SERPL-MCNC: 7 MG/DL (ref 8–22)
CALCIUM ALBUM COR SERPL-MCNC: 8.7 MG/DL (ref 8.5–10.5)
CALCIUM SERPL-MCNC: 8.6 MG/DL (ref 8.5–10.5)
CHLORIDE SERPL-SCNC: 103 MMOL/L (ref 96–112)
CK SERPL-CCNC: 46 U/L (ref 0–154)
CO2 SERPL-SCNC: 21 MMOL/L (ref 20–33)
COMMENT NL1176: NORMAL
CREAT SERPL-MCNC: 0.72 MG/DL (ref 0.5–1.4)
CRP SERPL HS-MCNC: 0.69 MG/DL (ref 0–0.75)
D DIMER PPP IA.FEU-MCNC: 2.82 UG/ML (FEU) (ref 0–0.5)
EOSINOPHIL # BLD AUTO: 0 K/UL (ref 0–0.51)
EOSINOPHIL NFR BLD: 0 % (ref 0–6.9)
ERYTHROCYTE [DISTWIDTH] IN BLOOD BY AUTOMATED COUNT: 44.1 FL (ref 35.9–50)
ERYTHROCYTE [SEDIMENTATION RATE] IN BLOOD BY WESTERGREN METHOD: 5 MM/HOUR (ref 0–20)
GFR SERPLBLD CREATININE-BSD FMLA CKD-EPI: 132 ML/MIN/1.73 M 2
GLOBULIN SER CALC-MCNC: 3.7 G/DL (ref 1.9–3.5)
GLUCOSE SERPL-MCNC: 101 MG/DL (ref 65–99)
HCT VFR BLD AUTO: 43.2 % (ref 42–52)
HGB BLD-MCNC: 15.2 G/DL (ref 14–18)
IRON SATN MFR SERPL: 29 % (ref 15–55)
IRON SERPL-MCNC: 89 UG/DL (ref 50–180)
LACTATE SERPL-SCNC: 1.4 MMOL/L (ref 0.5–2)
LACTATE SERPL-SCNC: 1.4 MMOL/L (ref 0.5–2)
LDH SERPL L TO P-CCNC: 631 U/L (ref 107–266)
LYMPHOCYTES # BLD AUTO: 18.4 K/UL (ref 1–4.8)
LYMPHOCYTES NFR BLD: 82.9 % (ref 22–41)
MAGNESIUM SERPL-MCNC: 2.2 MG/DL (ref 1.5–2.5)
MANUAL DIFF BLD: NORMAL
MCH RBC QN AUTO: 29 PG (ref 27–33)
MCHC RBC AUTO-ENTMCNC: 35.2 G/DL (ref 32.3–36.5)
MCV RBC AUTO: 82.3 FL (ref 81.4–97.8)
MONOCYTES # BLD AUTO: 0.38 K/UL (ref 0–0.85)
MONOCYTES NFR BLD AUTO: 1.7 % (ref 0–13.4)
MORPHOLOGY BLD-IMP: NORMAL
NEUTROPHILS # BLD AUTO: 1.33 K/UL (ref 1.82–7.42)
NEUTROPHILS NFR BLD: 6 % (ref 44–72)
NRBC # BLD AUTO: 0 K/UL
NRBC BLD-RTO: 0 /100 WBC (ref 0–0.2)
PHOSPHATE SERPL-MCNC: 3.5 MG/DL (ref 2.5–4.5)
PLATELET # BLD AUTO: 176 K/UL (ref 164–446)
PLATELET # BLD AUTO: 178 K/UL (ref 164–446)
PLATELET BLD QL SMEAR: NORMAL
PMV BLD AUTO: 8.8 FL (ref 9–12.9)
POTASSIUM SERPL-SCNC: 4 MMOL/L (ref 3.6–5.5)
PROCALCITONIN SERPL-MCNC: 0.11 NG/ML
PROCALCITONIN SERPL-MCNC: 0.12 NG/ML
PROT SERPL-MCNC: 7.6 G/DL (ref 6–8.2)
RBC # BLD AUTO: 5.25 M/UL (ref 4.7–6.1)
RBC BLD AUTO: PRESENT
SMUDGE CELLS BLD QL SMEAR: NORMAL
SODIUM SERPL-SCNC: 137 MMOL/L (ref 135–145)
TIBC SERPL-MCNC: 303 UG/DL (ref 250–450)
UIBC SERPL-MCNC: 214 UG/DL (ref 110–370)
URATE SERPL-MCNC: 4.3 MG/DL (ref 2.5–8.3)
WBC # BLD AUTO: 22.2 K/UL (ref 4.8–10.8)
WBC OTHER NFR BLD MANUAL: 9.4 %

## 2023-10-18 PROCEDURE — 85049 AUTOMATED PLATELET COUNT: CPT

## 2023-10-18 PROCEDURE — 36415 COLL VENOUS BLD VENIPUNCTURE: CPT

## 2023-10-18 PROCEDURE — 84550 ASSAY OF BLOOD/URIC ACID: CPT

## 2023-10-18 PROCEDURE — 86015 ACTIN ANTIBODY EACH: CPT

## 2023-10-18 PROCEDURE — 84100 ASSAY OF PHOSPHORUS: CPT

## 2023-10-18 PROCEDURE — 83550 IRON BINDING TEST: CPT

## 2023-10-18 PROCEDURE — 80503 PATH CLIN CONSLTJ SF 5-20: CPT

## 2023-10-18 PROCEDURE — 82977 ASSAY OF GGT: CPT

## 2023-10-18 PROCEDURE — 84520 ASSAY OF UREA NITROGEN: CPT

## 2023-10-18 PROCEDURE — 84460 ALANINE AMINO (ALT) (SGPT): CPT

## 2023-10-18 PROCEDURE — 85652 RBC SED RATE AUTOMATED: CPT

## 2023-10-18 PROCEDURE — 80053 COMPREHEN METABOLIC PANEL: CPT

## 2023-10-18 PROCEDURE — 83883 ASSAY NEPHELOMETRY NOT SPEC: CPT

## 2023-10-18 PROCEDURE — 80074 ACUTE HEPATITIS PANEL: CPT

## 2023-10-18 PROCEDURE — 86038 ANTINUCLEAR ANTIBODIES: CPT

## 2023-10-18 PROCEDURE — 88184 FLOWCYTOMETRY/ TC 1 MARKER: CPT

## 2023-10-18 PROCEDURE — 86140 C-REACTIVE PROTEIN: CPT

## 2023-10-18 PROCEDURE — 83615 LACTATE (LD) (LDH) ENZYME: CPT

## 2023-10-18 PROCEDURE — 86256 FLUORESCENT ANTIBODY TITER: CPT

## 2023-10-18 PROCEDURE — 770004 HCHG ROOM/CARE - ONCOLOGY PRIVATE *

## 2023-10-18 PROCEDURE — 83735 ASSAY OF MAGNESIUM: CPT

## 2023-10-18 PROCEDURE — 86039 ANTINUCLEAR ANTIBODIES (ANA): CPT

## 2023-10-18 PROCEDURE — 82550 ASSAY OF CK (CPK): CPT

## 2023-10-18 PROCEDURE — 84450 TRANSFERASE (AST) (SGOT): CPT

## 2023-10-18 PROCEDURE — 85379 FIBRIN DEGRADATION QUANT: CPT

## 2023-10-18 PROCEDURE — 85007 BL SMEAR W/DIFF WBC COUNT: CPT

## 2023-10-18 PROCEDURE — 86225 DNA ANTIBODY NATIVE: CPT

## 2023-10-18 PROCEDURE — 84145 PROCALCITONIN (PCT): CPT

## 2023-10-18 PROCEDURE — 83540 ASSAY OF IRON: CPT

## 2023-10-18 PROCEDURE — 86235 NUCLEAR ANTIGEN ANTIBODY: CPT

## 2023-10-18 PROCEDURE — 88185 FLOWCYTOMETRY/TC ADD-ON: CPT

## 2023-10-18 PROCEDURE — 83010 ASSAY OF HAPTOGLOBIN QUANT: CPT

## 2023-10-18 PROCEDURE — 85027 COMPLETE CBC AUTOMATED: CPT

## 2023-10-18 PROCEDURE — 82728 ASSAY OF FERRITIN: CPT

## 2023-10-18 PROCEDURE — 71260 CT THORAX DX C+: CPT

## 2023-10-18 PROCEDURE — 700117 HCHG RX CONTRAST REV CODE 255: Performed by: HOSPITALIST

## 2023-10-18 PROCEDURE — 87389 HIV-1 AG W/HIV-1&-2 AB AG IA: CPT

## 2023-10-18 PROCEDURE — 83605 ASSAY OF LACTIC ACID: CPT

## 2023-10-18 PROCEDURE — 99222 1ST HOSP IP/OBS MODERATE 55: CPT | Performed by: HOSPITALIST

## 2023-10-18 RX ORDER — PROMETHAZINE HYDROCHLORIDE 12.5 MG/1
12.5-25 SUPPOSITORY RECTAL EVERY 4 HOURS PRN
Status: DISCONTINUED | OUTPATIENT
Start: 2023-10-18 | End: 2023-10-24 | Stop reason: HOSPADM

## 2023-10-18 RX ORDER — ONDANSETRON 4 MG/1
4 TABLET, ORALLY DISINTEGRATING ORAL EVERY 4 HOURS PRN
Status: DISCONTINUED | OUTPATIENT
Start: 2023-10-18 | End: 2023-10-24 | Stop reason: HOSPADM

## 2023-10-18 RX ORDER — PROMETHAZINE HYDROCHLORIDE 25 MG/1
12.5-25 TABLET ORAL EVERY 4 HOURS PRN
Status: DISCONTINUED | OUTPATIENT
Start: 2023-10-18 | End: 2023-10-24 | Stop reason: HOSPADM

## 2023-10-18 RX ORDER — PROCHLORPERAZINE EDISYLATE 5 MG/ML
5-10 INJECTION INTRAMUSCULAR; INTRAVENOUS EVERY 4 HOURS PRN
Status: DISCONTINUED | OUTPATIENT
Start: 2023-10-18 | End: 2023-10-24 | Stop reason: HOSPADM

## 2023-10-18 RX ORDER — ONDANSETRON 2 MG/ML
4 INJECTION INTRAMUSCULAR; INTRAVENOUS EVERY 4 HOURS PRN
Status: DISCONTINUED | OUTPATIENT
Start: 2023-10-18 | End: 2023-10-24 | Stop reason: HOSPADM

## 2023-10-18 RX ADMIN — IOHEXOL 100 ML: 350 INJECTION, SOLUTION INTRAVENOUS at 22:47

## 2023-10-18 ASSESSMENT — ENCOUNTER SYMPTOMS
CHILLS: 1
BLURRED VISION: 0
FLANK PAIN: 0
DEPRESSION: 0
STRIDOR: 0
HEMOPTYSIS: 0
TREMORS: 0
MYALGIAS: 0
COUGH: 0
BLOOD IN STOOL: 0
HALLUCINATIONS: 0
VOMITING: 1
FALLS: 0
CONSTIPATION: 0
PND: 0
HEADACHES: 0
DIAPHORESIS: 0
DIARRHEA: 0
DIZZINESS: 0
TINGLING: 0
DOUBLE VISION: 0
HEARTBURN: 0
NAUSEA: 1
WHEEZING: 0
EYE PAIN: 0
ORTHOPNEA: 0
WEAKNESS: 0
POLYDIPSIA: 0
PHOTOPHOBIA: 0
FEVER: 1
NECK PAIN: 0
SORE THROAT: 0
SHORTNESS OF BREATH: 0
BACK PAIN: 0
SINUS PAIN: 0
SPUTUM PRODUCTION: 0
ABDOMINAL PAIN: 1
PALPITATIONS: 0
CLAUDICATION: 0
BRUISES/BLEEDS EASILY: 0

## 2023-10-18 ASSESSMENT — LIFESTYLE VARIABLES
SUBSTANCE_ABUSE: 0
HOW MANY TIMES IN THE PAST YEAR HAVE YOU HAD 5 OR MORE DRINKS IN A DAY: 0
ON A TYPICAL DAY WHEN YOU DRINK ALCOHOL HOW MANY DRINKS DO YOU HAVE: 1
TOTAL SCORE: 0
DOES PATIENT WANT TO STOP DRINKING: NO
TOTAL SCORE: 0
TOTAL SCORE: 0
EVER FELT BAD OR GUILTY ABOUT YOUR DRINKING: NO
EVER HAD A DRINK FIRST THING IN THE MORNING TO STEADY YOUR NERVES TO GET RID OF A HANGOVER: NO
CONSUMPTION TOTAL: NEGATIVE
ALCOHOL_USE: YES
HAVE YOU EVER FELT YOU SHOULD CUT DOWN ON YOUR DRINKING: NO
AVERAGE NUMBER OF DAYS PER WEEK YOU HAVE A DRINK CONTAINING ALCOHOL: 1
HAVE PEOPLE ANNOYED YOU BY CRITICIZING YOUR DRINKING: NO

## 2023-10-18 ASSESSMENT — PATIENT HEALTH QUESTIONNAIRE - PHQ9
2. FEELING DOWN, DEPRESSED, IRRITABLE, OR HOPELESS: NOT AT ALL
SUM OF ALL RESPONSES TO PHQ9 QUESTIONS 1 AND 2: 0
1. LITTLE INTEREST OR PLEASURE IN DOING THINGS: NOT AT ALL

## 2023-10-18 ASSESSMENT — COGNITIVE AND FUNCTIONAL STATUS - GENERAL
MOBILITY SCORE: 24
DAILY ACTIVITIY SCORE: 24
SUGGESTED CMS G CODE MODIFIER DAILY ACTIVITY: CH
SUGGESTED CMS G CODE MODIFIER MOBILITY: CH

## 2023-10-18 ASSESSMENT — FIBROSIS 4 INDEX: FIB4 SCORE: 1.41

## 2023-10-18 NOTE — PROGRESS NOTES
[facility] DIRECT ADMISSION REPORT  Transferring facility: [facility]  Transferring physician: Dr. Arthur    Chief complaint: N/V  Pertinent history & patient course: 22 man with history of osteomyelitis treated 10 years ago who presented with nausea and vomiting.  He had transaminitis with AST and ALT up to 400s and T. bili 6 on admission.  HIDA scan was normal, MRCP showed gallbladder wall thickening, however had enlarged liver and spleen.  He was noted to have persistent leukocytosis, WBC 19.5, peripheral smear showed it was lymphocytic predominant.  LDH was 650.  Viral hepatitis screen was negative.  They spoke with Dr. Short regarding possible lymphoma, who recommended transfer for consult.  Consultants called prior to transfer and pertinent input from consultants: Dr. Short  Patient accepted for transfer: Yes  Accepting [facility]: [facility] - Nursing to notify the Triage Coordinator in the RTOC via Voalte or Phone ext. [phone] when patient arrives to the unit. The Triage Coordinator will assign the admitting provider.    Consultants to be called upon arrival: Oncology  Admission status: Inpatient.   Floor requested: oncology    The admitting provider is the point of contact for questions or concerns regarding patient's care.

## 2023-10-19 PROBLEM — R74.8 ELEVATED LIVER ENZYMES: Status: ACTIVE | Noted: 2023-10-18

## 2023-10-19 PROBLEM — D72.820 LYMPHOCYTOSIS: Status: ACTIVE | Noted: 2023-10-18

## 2023-10-19 PROBLEM — B16.9 ACUTE HEPATITIS B: Status: ACTIVE | Noted: 2023-10-19

## 2023-10-19 PROBLEM — B16.9 ACUTE HEPATITIS B: Status: ACTIVE | Noted: 2023-10-18

## 2023-10-19 PROBLEM — E80.6 BILIRUBINEMIA: Status: ACTIVE | Noted: 2023-10-19

## 2023-10-19 LAB
ALBUMIN SERPL BCP-MCNC: 3.6 G/DL (ref 3.2–4.9)
ALBUMIN/GLOB SERPL: 1 G/DL
ALP SERPL-CCNC: 579 U/L (ref 30–99)
ALT SERPL-CCNC: 359 U/L (ref 2–50)
ANION GAP SERPL CALC-SCNC: 8 MMOL/L (ref 7–16)
AST SERPL-CCNC: 200 U/L (ref 12–45)
BASOPHILS # BLD AUTO: 0.9 % (ref 0–1.8)
BASOPHILS # BLD: 0.16 K/UL (ref 0–0.12)
BILIRUB CONJ SERPL-MCNC: 2.8 MG/DL (ref 0.1–0.5)
BILIRUB SERPL-MCNC: 4.2 MG/DL (ref 0.1–1.5)
BUN SERPL-MCNC: 8 MG/DL (ref 8–22)
CALCIUM ALBUM COR SERPL-MCNC: 9.3 MG/DL (ref 8.5–10.5)
CALCIUM SERPL-MCNC: 9 MG/DL (ref 8.5–10.5)
CHLORIDE SERPL-SCNC: 101 MMOL/L (ref 96–112)
CO2 SERPL-SCNC: 26 MMOL/L (ref 20–33)
CREAT SERPL-MCNC: 0.85 MG/DL (ref 0.5–1.4)
EOSINOPHIL # BLD AUTO: 0 K/UL (ref 0–0.51)
EOSINOPHIL NFR BLD: 0 % (ref 0–6.9)
ERYTHROCYTE [DISTWIDTH] IN BLOOD BY AUTOMATED COUNT: 45.8 FL (ref 35.9–50)
FERRITIN SERPL-MCNC: 782 NG/ML (ref 22–322)
GFR SERPLBLD CREATININE-BSD FMLA CKD-EPI: 125 ML/MIN/1.73 M 2
GLOBULIN SER CALC-MCNC: 3.7 G/DL (ref 1.9–3.5)
GLUCOSE SERPL-MCNC: 102 MG/DL (ref 65–99)
HAV IGM SERPL QL IA: ABNORMAL
HBV CORE IGM SER QL: REACTIVE
HBV SURFACE AG SER QL: ABNORMAL
HCT VFR BLD AUTO: 44.5 % (ref 42–52)
HCV AB SER QL: ABNORMAL
HGB BLD-MCNC: 15.2 G/DL (ref 14–18)
HIV 1+2 AB+HIV1 P24 AG SERPL QL IA: NORMAL
LYMPHOCYTES # BLD AUTO: 15.81 K/UL (ref 1–4.8)
LYMPHOCYTES NFR BLD: 86.4 % (ref 22–41)
MANUAL DIFF BLD: NORMAL
MCH RBC QN AUTO: 28.1 PG (ref 27–33)
MCHC RBC AUTO-ENTMCNC: 34.2 G/DL (ref 32.3–36.5)
MCV RBC AUTO: 82.3 FL (ref 81.4–97.8)
MONOCYTES # BLD AUTO: 0.62 K/UL (ref 0–0.85)
MONOCYTES NFR BLD AUTO: 3.4 % (ref 0–13.4)
MORPHOLOGY BLD-IMP: NORMAL
NEUTROPHILS # BLD AUTO: 1.39 K/UL (ref 1.82–7.42)
NEUTROPHILS NFR BLD: 7.6 % (ref 44–72)
NRBC # BLD AUTO: 0 K/UL
NRBC BLD-RTO: 0 /100 WBC (ref 0–0.2)
PATH REV: NORMAL
PATH REV: NORMAL
PATHOLOGY CONSULT NOTE: NORMAL
PLATELET # BLD AUTO: 174 K/UL (ref 164–446)
PLATELET BLD QL SMEAR: NORMAL
PMV BLD AUTO: 9.2 FL (ref 9–12.9)
POTASSIUM SERPL-SCNC: 5.4 MMOL/L (ref 3.6–5.5)
PROT SERPL-MCNC: 7.3 G/DL (ref 6–8.2)
RBC # BLD AUTO: 5.41 M/UL (ref 4.7–6.1)
RBC BLD AUTO: PRESENT
SMUDGE CELLS BLD QL SMEAR: NORMAL
SODIUM SERPL-SCNC: 135 MMOL/L (ref 135–145)
WBC # BLD AUTO: 18.3 K/UL (ref 4.8–10.8)
WBC OTHER NFR BLD MANUAL: 1.7 %

## 2023-10-19 PROCEDURE — 99222 1ST HOSP IP/OBS MODERATE 55: CPT | Performed by: INTERNAL MEDICINE

## 2023-10-19 PROCEDURE — 85027 COMPLETE CBC AUTOMATED: CPT

## 2023-10-19 PROCEDURE — 85007 BL SMEAR W/DIFF WBC COUNT: CPT

## 2023-10-19 PROCEDURE — 80053 COMPREHEN METABOLIC PANEL: CPT

## 2023-10-19 PROCEDURE — 87517 HEPATITIS B DNA QUANT: CPT

## 2023-10-19 PROCEDURE — 770004 HCHG ROOM/CARE - ONCOLOGY PRIVATE *

## 2023-10-19 PROCEDURE — 82248 BILIRUBIN DIRECT: CPT

## 2023-10-19 PROCEDURE — 88184 FLOWCYTOMETRY/ TC 1 MARKER: CPT

## 2023-10-19 PROCEDURE — 36415 COLL VENOUS BLD VENIPUNCTURE: CPT

## 2023-10-19 PROCEDURE — 88189 FLOWCYTOMETRY/READ 16 & >: CPT

## 2023-10-19 PROCEDURE — 99233 SBSQ HOSP IP/OBS HIGH 50: CPT | Performed by: STUDENT IN AN ORGANIZED HEALTH CARE EDUCATION/TRAINING PROGRAM

## 2023-10-19 PROCEDURE — 88185 FLOWCYTOMETRY/TC ADD-ON: CPT | Mod: 91

## 2023-10-19 ASSESSMENT — ENCOUNTER SYMPTOMS
WEIGHT LOSS: 1
EYES NEGATIVE: 1
DIAPHORESIS: 1
CARDIOVASCULAR NEGATIVE: 1
FEVER: 1
CHILLS: 1
ABDOMINAL PAIN: 1
RESPIRATORY NEGATIVE: 1
MUSCULOSKELETAL NEGATIVE: 1
NEUROLOGICAL NEGATIVE: 1

## 2023-10-19 ASSESSMENT — FIBROSIS 4 INDEX: FIB4 SCORE: 1.33

## 2023-10-19 NOTE — CARE PLAN
The patient is Watcher - Medium risk of patient condition declining or worsening    Shift Goals  Clinical Goals: decrease in nausea  Patient Goals: choose menu options  Family Goals: na    Progress made toward(s) clinical / shift goals:  pt appetite has improved and states minimal nausea at this time. Pt was assisted with dietary options by diet aid.     Patient is not progressing towards the following goals:

## 2023-10-19 NOTE — CARE PLAN
The patient is Watcher - Medium risk of patient condition declining or worsening    Shift Goals  Clinical Goals: pt will get CT scan done  Patient Goals: pt will eat food and rest comfortably  Family Goals: none present at this time    Progress made toward(s) clinical / shift goals: pt was able to complete CT scan during the night, was able to eat, and has been resting comfortably at this time.     Patient is not progressing towards the following goals: N/A

## 2023-10-19 NOTE — DIETARY
"Nutrition services: Day 1 of admit.  Gene Villanueva is a 22 y.o. male admitted acute liver failure, leukocytosis, splenomegaly  History includes: osteomyelitis  Patient with weight loss and poor appetite noted on admit screen.    Patient reported he has been losing weight for the past month.  Used stand-up scale today and got updated weight.  Patient has lost 20# in the past month.  He stated for at least the past week he has been eating <50% of usual due to vomiting and abdominal pain.   He stated appetite is starting to improve.  He agreed to try Boost supplements.    Assessment:  Height: 67\"  Weight: 71.2 kg (157 lb)   Body mass index is 24.58 kg/m².      Diet/Intake: Regular    Evaluation:   , , Alkaline Phos 579    Weight history   10/18/23 74.4 kg (164 lb 0.4 oz)   10/14/23 74.4 kg (164 lb)   10/06/23 79.4 kg (175 lb)     Malnutrition Risk: Patient meets ASPEN criteria for acute illness related malnutrition as evidenced by intakes <50% for >5 days and severe weight loss of 11% in the past month.    Recommendations/Inteventions/Plan:  Boost added   Encourage intake of meals and supplements  Document intake of all PO as % taken in ADL's to provide interdisciplinary communication across all shifts.   Monitor weight.  Nutrition rep will continue to see patient for ongoing meal and snack preferences.     RD following.    "

## 2023-10-19 NOTE — PROGRESS NOTES
Hospital Medicine Daily Progress Note    Date of Service  10/19/2023    Chief Complaint  Gene Villanueva is a 22 y.o. male admitted 10/18/2023 with transferred from [facility] for organomegaly with lymphocytosis.    Hospital Course  22-year-old white male who was transferred from [facility] for further evaluation of organomegaly with lymphocytosis.  Reported nausea, vomiting, weight loss. recent ear infection was treated on antibiotics.  He had a swollen lymph node in the right angle of the jaw.  Over the past week or so he noticed little bit more discomfort in the right upper quadrant and also became jaundiced.  He denies any alcohol abuse.  He denies drug IV abuse except using marijuana.  He did admit to unprotected male male sex early October.     The patient reports having infection of osteomyelitis in his right shoulder when he was younger.  He said he was told it was from drinking pond water.  He also lost the top of his second toe secondary to sliding into the lawn more on multiple occasions while he was trying to practice for baseball.     He does not really report any history that he was told that he ever had mononucleosis.    Per chart review, he had lymphocytosis in 2014 (WBC 7, lymphocyte 58%), EBV IgG was positive at that time.  He said he has not had any follow-up since then.    Interval Problem Update  Patient was seen and examined at bedside    HBV IgM positive, ordered HBV PCR, discussed with GI  Elevated liver enzyme and bilirubin    Significant lymphocytosis  Elevated LDH and D-dimer  CT abdomen showed hepatomegaly, splenomegaly, Mediastinal, right hilar, tonya hepatis, and mild bilateral axillary adenopathy.    I discussed with oncology Dr. Short, ordered flow cytometry, may consider bone marrow if indicated    I have discussed this patient's plan of care and discharge plan at IDT rounds today with Case Management, Nursing, Nursing leadership, and other members  of the IDT team.    Consultants/Specialty  Oncology, GI    Code Status  Full Code    Disposition  The patient is not medically cleared for discharge to home or a post-acute facility.      I have placed the appropriate orders for post-discharge needs.    Review of Systems  All 12 systems were reviewed and negative except as mentioned above      Physical Exam  Temp:  [36.9 °C (98.5 °F)-37.6 °C (99.6 °F)] 37 °C (98.6 °F)  Pulse:  [76-96] 76  Resp:  [16-20] 16  BP: (112-130)/(61-75) 112/63  SpO2:  [95 %-96 %] 96 %    Physical Exam  HENT:      Head: Normocephalic.      Nose: Nose normal.      Mouth/Throat:      Mouth: Mucous membranes are moist.   Eyes:      Extraocular Movements: Extraocular movements intact.      Conjunctiva/sclera: Conjunctivae normal.   Cardiovascular:      Rate and Rhythm: Normal rate and regular rhythm.      Pulses: Normal pulses.      Heart sounds: Normal heart sounds.   Pulmonary:      Effort: Pulmonary effort is normal.      Breath sounds: Normal breath sounds. No wheezing or rales.   Abdominal:      General: Bowel sounds are normal.      Palpations: Abdomen is soft.      Tenderness: There is no abdominal tenderness. There is no guarding.      Comments: Splenomegaly, mild tenderness   Musculoskeletal:         General: No swelling or tenderness. Normal range of motion.      Cervical back: Normal range of motion and neck supple.   Lymphadenopathy:      Cervical: Cervical adenopathy present.      Left cervical: Superficial cervical adenopathy present.   Skin:     General: Skin is warm.   Neurological:      Mental Status: He is alert and oriented to person, place, and time. Mental status is at baseline.   Psychiatric:         Mood and Affect: Mood normal.         Fluids  No intake or output data in the 24 hours ending 10/19/23 1523    Laboratory  Recent Labs     10/18/23  1900 10/18/23  2233 10/19/23  0003   WBC 22.2*  --  18.3*   RBC 5.25  --  5.41   HEMOGLOBIN 15.2  --  15.2   HEMATOCRIT 43.2  --   44.5   MCV 82.3  --  82.3   MCH 29.0  --  28.1   MCHC 35.2  --  34.2   RDW 44.1  --  45.8   PLATELETCT 176 178 174   MPV 8.8*  --  9.2     Recent Labs     10/18/23  1900 10/19/23  0003   SODIUM 137 135   POTASSIUM 4.0 5.4   CHLORIDE 103 101   CO2 21 26   GLUCOSE 101* 102*   BUN 7* 8   CREATININE 0.72 0.85   CALCIUM 8.6 9.0                   Imaging  CT-CHEST,ABDOMEN,PELVIS WITH   Final Result         1.  Hepatomegaly   2.  Splenomegaly   3.  Mediastinal, right hilar, tonya hepatis, and mild bilateral axillary adenopathy.           Assessment/Plan  * Elevated liver enzymes  Assessment & Plan  Likely due to acute hepatitis B, HBV IgM positive  Pending LUCY, INR, ammonia, anti-smooth muscle antibody, ESR, CRP, uric acid, LDH, chronic hepatitis, direct bilirubin, flow cytometry, iron panel  HIDA scan and MRCP were negative        Acute hepatitis B  Assessment & Plan  HBV IgM positive  Elevated liver enzyme and bilirubin  I ordered HBV virus load    I discussed with GI    Lymphocytosis  Assessment & Plan  WBC 22, lymphocytes dominate 86%  Hold off on antibiotics for now  procalcitonin 0.11    Pending work-up for lymphoma/AL L      Splenomegaly- (present on admission)  Assessment & Plan  CT abdomen showed hepatomegaly, splenomegaly, Mediastinal, right hilar, tonya hepatis, and mild bilateral axillary adenopathy.  Significant lymphocytosis    Per chart review, he had lymphocytosis in 2014 (WBC 7, lymphocyte 58%), EBV IgG was positive at that time.  He said he has not had any follow-up since then.    Pending work-up for lymphoma/ALL  I discussed with oncology           VTE prophylaxis: SCD    I have performed a physical exam and reviewed and updated ROS and Plan today (10/19/2023). In review of yesterday's note (10/18/2023), there are no changes except as documented above.      I spent greater than 52 minutes for chart review, obtaining history independently, performing medically appropriate examination,  documenting ,  ordering medications, tests, or procedures, referring and communicating with other health care professionals, Independently interpreting results and communicating results with patient/family/caregiver. More than 50% of time was spent in face-to-face clinical encounter.

## 2023-10-19 NOTE — CONSULTS
Consult Note: Hematology/Oncology    Date of consultation: 10/19/2023 10:42 AM    Referring provider: Melvin Arthur M.    Reason for consultation: Lymphocytosis    History of presenting illness:     22-year-old white male who was transferred from [facility] for further evaluation of organomegaly with lymphocytosis.    The patient reports that he is probably been sick for several months.  Over the past month he has had more fevers and night sweats.  Probably in the last couple weeks has had more nausea with intermittent vomiting.  He has had weight loss.  He does believe that some of the symptoms predate the beginning of October.  He also has had a recent ear infection.  He had been on antibiotics.  He had a swollen lymph node in the right angle of the jaw.  Over the past week or so he noticed little bit more discomfort in the right upper quadrant and also became jaundiced.  He denies any alcohol abuse.  He denies drug IV abuse except using marijuana.  He did admit to unprotected male male sex early October.    He lives with his parents.  His parents are aware that he is here.  No other family history that is contributory in the sense of hematologic/oncologic history.    The patient reports having infection of osteomyelitis in his right shoulder when he was younger.  He said he was told it was from drinking pond water.  He also lost the top of his second toe secondary to sliding into the lawn more on multiple occasions while he was trying to practice for baseball.    He does not really report any history that he was told that he ever had mononucleosis.        Past Medical History:    Past Medical History:   Diagnosis Date    Back pain     Infectious disease 02/2014    Osteomyelitis of right shoulder    Nontraumatic rupture of extensor tendons of hand and wrist 04/04/2014    Osteomyelitis (HCC) 02/23/2014    Surgery, elective     R hand sx,  R foot amputation of 2 toe       Past surgical  history:    Past Surgical History:   Procedure Laterality Date    EXTENSOR TENDON REPAIR  4/4/2014    Performed by Vitor Castro M.D. at [facility]       Allergies:  Other environmental and Acetaminophen    Medications:    Current Facility-Administered Medications   Medication Dose Route Frequency Provider Last Rate Last Admin    ondansetron (Zofran) syringe/vial injection 4 mg  4 mg Intravenous Q4HRS PRОЛЬГА Del Angel M.D.        ondansetron (Zofran ODT) dispertab 4 mg  4 mg Oral Q4HRS PRОЛЬГА Del Angel M.D.        promethazine (Phenergan) tablet 12.5-25 mg  12.5-25 mg Oral Q4HRS TAMARA Del Angel M.D.        promethazine (Phenergan) suppository 12.5-25 mg  12.5-25 mg Rectal Q4HRS PRОЛЬГА Del Angel M.D.        prochlorperazine (Compazine) injection 5-10 mg  5-10 mg Intravenous Q4HRS TAMARA Del Angel M.D.           Social History:     Social History     Socioeconomic History    Marital status: Single     Spouse name: Not on file    Number of children: Not on file    Years of education: Not on file    Highest education level: Not on file   Occupational History    Not on file   Tobacco Use    Smoking status: Never    Smokeless tobacco: Never   Vaping Use    Vaping Use: Some days    Substances: Nicotine   Substance and Sexual Activity    Alcohol use: Yes     Alcohol/week: 1.2 oz     Types: 2 Standard drinks or equivalent per week    Drug use: Yes     Frequency: 21.0 times per week     Types: Marijuana, Inhaled    Sexual activity: Not on file   Other Topics Concern    Not on file   Social History Narrative    Not on file     Social Determinants of Health     Financial Resource Strain: Not on file   Food Insecurity: Not on file   Transportation Needs: Not on file   Physical Activity: Not on file   Stress: Not on file   Social Connections: Not on file   Intimate Partner Violence: Not on file   Housing Stability: Not on file       Family History:     Family History   Problem Relation Age of Onset    No  Known Problems Mother     No Known Problems Father     No Known Problems Brother     No Known Problems Brother     No Known Problems Brother        Review of Systems:   All other review of systems are negative except what was mentioned above in the HPI.    Constitutional: fever, chills, weight loss ,malaise/fatigue.    HEENT: No new auditory or visual complaints. No sore throat and neck pain.     Respiratory:No new cough, sputum production, shortness of breath and wheezing.    Cardiovascular: No new chest pain, palpitations, orthopnea and leg swelling.    Gastrointestinal: nausea, vomiting ,abdominal pain   Genitourinary: Negative for dysuria, hematuria    Musculoskeletal: No new arthralgias or myalgias   Skin: Negative for rash and itching.    Neurological: Negative for focal weakness or headaches.    Endo/Heme/Allergies: No abnormal bleed/bruise.    Psychiatric/Behavioral: No new depression/anxiety.    Physical Exam:   Vitals:   /63   Pulse 76   Temp 37 °C (98.6 °F) (Temporal)   Resp 16   Wt 74.4 kg (164 lb 0.4 oz)   SpO2 96%   BMI 25.69 kg/m²     General: Not in acute distress, alert and oriented x 3  HEENT: No pallor, icterus. Oropharynx clear.   Neck: Supple, no palpable masses.  Lymph nodes: Subcentimeter palpable lymph node in the left cervical area.  He also had a small 10 mm lymph node in the right angle of the jaw.  This is something he has noticed since he was sick.  I do not appreciate any other adenopathy in the axilla.  CVS: regular rate and rhythm, no rubs or gallops  RESP: Clear to auscultate bilaterally, no wheezing or crackles.   ABD: Positive for splenomegaly with some minimal tenderness.  EXT: No edema or cyanosis  CNS: Alert and oriented x3, No focal deficits.  Skin- No rash      Labs:   Recent Labs     10/18/23  1900 10/18/23  2227 10/18/23  2233 10/19/23  0003   RBC 5.25  --   --  5.41   HEMOGLOBIN 15.2  --   --  15.2   HEMATOCRIT 43.2  --   --  44.5   PLATELETCT 176  --  178 174    IRON  --  89  --   --    FERRITIN  --  782.0*  --   --    TOTIRONBC  --  303  --   --      Lab Results   Component Value Date/Time    SODIUM 135 10/19/2023 12:03 AM    POTASSIUM 5.4 10/19/2023 12:03 AM    CHLORIDE 101 10/19/2023 12:03 AM    CO2 26 10/19/2023 12:03 AM    GLUCOSE 102 (H) 10/19/2023 12:03 AM    BUN 8 10/19/2023 12:03 AM    CREATININE 0.85 10/19/2023 12:03 AM        Assessment and Plan:  22-year-old gentleman with the above history concerning for an underlying lymphoproliferative type disorder over something more acute with his presentation with no anemia or thrombocytopenia.  He does have elevated bilirubin with mostly a direct component/liver functions elevated as well.  He also has an elevated LDH.  CT imaging shows hepatosplenomegaly with shotty lymph nodes that are very nonspecific near the tonya hepatis, mediastinum, axillary.  He was found here at [facility] to be hepatitis B IgM core positive.      His peripheral smear shows very large lymphocytes which are more concerning for something more than just a reactive process understanding reactive process for him could be his hepatitis.  However the mention of smudge cells just is reflective of the fragility of the cells but definitely can be concerning as well.  His peripheral smear did not look like acute leukemia.  Dr. Collier in pathology reviewed the slide with us as well.    The patient is clinically overall stable but we will go ahead and do a peripheral blood for flow cytometry.  We reviewed his slide with the pathologist and they agree with the assessment.  I think this is reasonable since he has a peripheral leukocytosis with an absolute lymphocyte count over 5000 to assess.  He would be happy not to do a bone marrow just yet.    I would recommend gastroenterology seeing the patient specially for this acute hepatitis B since this is predominantly driving his liver dysfunction most likely.  However it be unusual to get  splenomegaly in early phase so I do think some of his other symptoms could be technically be symptoms is hard to say.  We will see what his flow cytometry shows and make further decisions.  The pathologist feel that if they send it to [facility] we could have some answers even tomorrow.        Please note that this dictation was created using voice recognition software. I have made every reasonable attempt to correct obvious errors, but I expect that there are errors of grammar and possibly content that I did not discover before finalizing the note.      SIGNATURES:  Chris Short M.D.    CC:  Ricardo Lindquist D.N.P.

## 2023-10-19 NOTE — PROGRESS NOTES
4 Eyes Skin Assessment Completed by Tip HAMLIN RN and Ruth WYATT RN.    Head WDL  Ears WDL  Nose WDL  Mouth WDL  Neck WDL  Breast/Chest WDL  Shoulder Blades WDL  Spine WDL  (R) Arm/Elbow/Hand WDL  (L) Arm/Elbow/Hand WDL  Abdomen WDL  Groin WDL  Scrotum/Coccyx/Buttocks WDL  (R) Leg WDL  (L) Leg WDL  (R) Heel/Foot/Toe WDL  (L) Heel/Foot/Toe WDL          Devices In Places none      Interventions In Place Pillows and Pressure Redistribution Mattress    Possible Skin Injury No    Pictures Uploaded Into Epic N/A  Wound Consult Placed N/A  RN Wound Prevention Protocol Ordered No       All roque prominences checked; WDL. No possible skin injuries noted at this time.

## 2023-10-19 NOTE — CONSULTS
Gastroenterology Initial Consult Note               Author:  Lavern Henriquez M.D. Date & Time Created: 10/19/2023 12:03 PM       Patient ID:  Name:             Gene Villanueva    YOB: 2000  Age:                 22 y.o.  male  MRN:               [id number]      Referring Provider:  Noris Montalvo MD        Presenting Chief Complaint:  HBV IgM +      History of Present Illness:    This is a  22 y.o. male presented to [facility] on October 14 with nausea and vomiting, intermittent fevers, LUQ worse with deep inspiration and his mother noted him to be jaundiced the day prior.  On presentation, his , , alkaline phosphatase 437 and bilirubin 6.3.  He was initially thought to have alcohol induced liver disease but CBC showed elevated WBC and atypical lymphs.  LDH was 650.  MRCP and HIDA results not available but US abd showed spleen 16.5cm otherwise normal.    History notable for recent trip to [place] (Sept 9-Sept 16 and was in the woods for 2 weeks and did have mosquito bites. He 2 days of feeling unwell before he left but definitely had flulike symptoms when he returned.  He was seen in urgent care and given amoxicillin for left ear infection.  Dr. Short was able to elicit a history of male male sexual encounter earlier this month and patient stated it was unprotected oral sex.      Current labs:  WBC 18.3, plt 174, 86.4% lymph, moderate smudge cells  , , , bilirubin 4.2  HAV IgM neg, HBsAg neg, HBcore IgM positive, HCV Ab neg  HIV NR  CT chest/abd/pel with:  mediastinal and right hilar lymph nodes, bilat axillary nodes, hepatomegaly, splenomegaly, tonya hepatis nodes up to 1cm      Review of Systems:  Review of Systems   Constitutional:  Positive for chills, diaphoresis, fever, malaise/fatigue and weight loss.   HENT: Negative.     Eyes: Negative.    Respiratory: Negative.     Cardiovascular: Negative.    Gastrointestinal:  Positive for abdominal pain.    Genitourinary: Negative.    Musculoskeletal: Negative.    Skin: Negative.    Neurological: Negative.    Endo/Heme/Allergies: Negative.              Past Medical History:  Past Medical History:   Diagnosis Date    Back pain     Infectious disease 02/2014    Osteomyelitis of right shoulder    Nontraumatic rupture of extensor tendons of hand and wrist 04/04/2014    Osteomyelitis (HCC) 02/23/2014    Surgery, elective     R hand sx,  R foot amputation of 2 toe     Active Hospital Problems    Diagnosis     Splenomegaly [R16.1]     Acute liver failure [K72.00]     Leukocytosis [D72.829]          Past Surgical History:  Past Surgical History:   Procedure Laterality Date    EXTENSOR TENDON REPAIR  4/4/2014    Performed by Vitor Castro M.D. at SURGERY [facility] Medications:  Current Facility-Administered Medications   Medication Dose Frequency Provider Last Rate Last Admin    ondansetron (Zofran) syringe/vial injection 4 mg  4 mg Q4HRS PRОЛЬГА Del Angel M.D.        ondansetron (Zofran ODT) dispertab 4 mg  4 mg Q4HRS PRN Jitendra Del Angel M.D.        promethazine (Phenergan) tablet 12.5-25 mg  12.5-25 mg Q4HRS PRОЛЬГА Del Angel M.D.        promethazine (Phenergan) suppository 12.5-25 mg  12.5-25 mg Q4HRS PRОЛЬГА Del Angel M.D.        prochlorperazine (Compazine) injection 5-10 mg  5-10 mg Q4HRS PRОЛЬГА Del Angel M.D.       Last reviewed on 10/18/2023  9:18 PM by Tip Garcia R.N.       Current Outpatient Medications:  No medications prior to admission.         Medication Allergies:  Allergies   Allergen Reactions    Other Environmental Runny Nose     Seasonal allergies, dry, or runny nose, itchy and dry eyes    Acetaminophen Unspecified     Elevated liver enzymes         Family Medical History:  Family History   Problem Relation Age of Onset    No Known Problems Mother     No Known Problems Father     No Known Problems Brother     No Known Problems Brother     No Known Problems Brother           Social History:  Social History     Socioeconomic History    Marital status: Single     Spouse name: Not on file    Number of children: Not on file    Years of education: Not on file    Highest education level: GED or equivalent   Occupational History    Not on file   Tobacco Use    Smoking status: Never    Smokeless tobacco: Never   Vaping Use    Vaping Use: Some days    Substances: Nicotine   Substance and Sexual Activity    Alcohol use: Yes     Alcohol/week: 1.2 oz     Types: 2 Standard drinks or equivalent per week    Drug use: Yes     Frequency: 21.0 times per week     Types: Marijuana, Inhaled    Sexual activity: Not on file   Other Topics Concern    Not on file   Social History Narrative    Not on file     Social Determinants of Health     Financial Resource Strain: Medium Risk (10/19/2023)    Overall Financial Resource Strain (CARDIA)     Difficulty of Paying Living Expenses: Somewhat hard   Food Insecurity: No Food Insecurity (10/19/2023)    Hunger Vital Sign     Worried About Running Out of Food in the Last Year: Never true     Ran Out of Food in the Last Year: Never true   Transportation Needs: No Transportation Needs (10/19/2023)    PRAPARE - Transportation     Lack of Transportation (Medical): No     Lack of Transportation (Non-Medical): No   Physical Activity: Unknown (10/19/2023)    Exercise Vital Sign     Days of Exercise per Week: 0 days     Minutes of Exercise per Session: Patient refused   Stress: Stress Concern Present (10/19/2023)    [employer] - Occupational Stress Questionnaire     Feeling of Stress : To some extent   Social Connections: Socially Isolated (10/19/2023)    Social Connection and Isolation Panel [NHANES]     Frequency of Communication with Friends and Family: Once a week     Frequency of Social Gatherings with Friends and Family: Once a week     Attends Religion Services: Never     Active Member of Clubs or Organizations: No     Attends Club  or Organization Meetings: Patient refused     Marital Status: Never    Intimate Partner Violence: Not on file   Housing Stability: Low Risk  (10/19/2023)    Housing Stability Vital Sign     Unable to Pay for Housing in the Last Year: No     Number of Places Lived in the Last Year: 1     Unstable Housing in the Last Year: No         Vital signs:  Weight/BMI: Body mass index is 25.69 kg/m².  /63   Pulse 76   Temp 37 °C (98.6 °F) (Temporal)   Resp 16   Wt 74.4 kg (164 lb 0.4 oz)   SpO2 96%   Vitals:    10/18/23 1953 10/18/23 2120 10/19/23 0450 10/19/23 0832   BP: 117/70  122/61 112/63   Pulse: 91  77 76   Resp: 18  16 16   Temp: 37.1 °C (98.7 °F)  36.9 °C (98.5 °F) 37 °C (98.6 °F)   TempSrc: Oral  Temporal Temporal   SpO2: 96%  95% 96%   Weight:  74.4 kg (164 lb 0.4 oz)       Oxygen Therapy:  Pulse Oximetry: 96 %, O2 (LPM): 0, O2 Delivery Device: None - Room Air  No intake or output data in the 24 hours ending 10/19/23 1203      Physical Exam:  Physical Exam  Constitutional:       Appearance: Normal appearance. He is normal weight.   HENT:      Head: Normocephalic and atraumatic.      Nose: Nose normal.      Mouth/Throat:      Mouth: Mucous membranes are moist.   Eyes:      General: Scleral icterus present.      Pupils: Pupils are equal, round, and reactive to light.   Neck:      Comments: Submandibular adenopathy  Cardiovascular:      Rate and Rhythm: Normal rate and regular rhythm.      Heart sounds: Normal heart sounds.   Pulmonary:      Breath sounds: Normal breath sounds.   Abdominal:      General: Bowel sounds are normal.      Palpations: Abdomen is soft.      Tenderness: There is no abdominal tenderness.   Musculoskeletal:         General: Normal range of motion.      Cervical back: Neck supple.   Skin:     General: Skin is warm and dry.      Coloration: Skin is jaundiced.   Neurological:      Mental Status: He is alert and oriented to person, place, and time.                 Labs:  Recent Labs      10/18/23  1900 10/19/23  0003   SODIUM 137 135   POTASSIUM 4.0 5.4   CHLORIDE 103 101   CO2 21 26   BUN 7* 8   CREATININE 0.72 0.85   MAGNESIUM 2.2  --    PHOSPHORUS 3.5  --    CALCIUM 8.6 9.0     Recent Labs     10/18/23  1900 10/19/23  0003   ALTSGPT 354* 359*   ASTSGOT 212* 200*   ALKPHOSPHAT 604* 579*   TBILIRUBIN 4.7* 4.2*   DBILIRUBIN  --  2.8*   GLUCOSE 101* 102*     Recent Labs     10/18/23  1900 10/19/23  0003   WBC 22.2* 18.3*   NEUTSPOLYS 6.00* 7.60*   LYMPHOCYTES 82.90* 86.40*   MONOCYTES 1.70 3.40   EOSINOPHILS 0.00 0.00   BASOPHILS 0.00 0.90   ASTSGOT 212* 200*   ALTSGPT 354* 359*   ALKPHOSPHAT 604* 579*   TBILIRUBIN 4.7* 4.2*     Recent Labs     10/18/23  1900 10/18/23  2227 10/18/23  2233 10/19/23  0003   RBC 5.25  --   --  5.41   HEMOGLOBIN 15.2  --   --  15.2   HEMATOCRIT 43.2  --   --  44.5   PLATELETCT 176  --  178 174   IRON  --  89  --   --    FERRITIN  --  782.0*  --   --    TOTIRONBC  --  303  --   --      Recent Results (from the past 24 hour(s))   CBC WITH DIFFERENTIAL    Collection Time: 10/18/23  7:00 PM   Result Value Ref Range    WBC 22.2 (H) 4.8 - 10.8 K/uL    RBC 5.25 4.70 - 6.10 M/uL    Hemoglobin 15.2 14.0 - 18.0 g/dL    Hematocrit 43.2 42.0 - 52.0 %    MCV 82.3 81.4 - 97.8 fL    MCH 29.0 27.0 - 33.0 pg    MCHC 35.2 32.3 - 36.5 g/dL    RDW 44.1 35.9 - 50.0 fL    Platelet Count 176 164 - 446 K/uL    MPV 8.8 (L) 9.0 - 12.9 fL    Neutrophils-Polys 6.00 (L) 44.00 - 72.00 %    Lymphocytes 82.90 (H) 22.00 - 41.00 %    Monocytes 1.70 0.00 - 13.40 %    Eosinophils 0.00 0.00 - 6.90 %    Basophils 0.00 0.00 - 1.80 %    Nucleated RBC 0.00 0.00 - 0.20 /100 WBC    Neutrophils (Absolute) 1.33 (L) 1.82 - 7.42 K/uL    Lymphs (Absolute) 18.40 (H) 1.00 - 4.80 K/uL    Monos (Absolute) 0.38 0.00 - 0.85 K/uL    Eos (Absolute) 0.00 0.00 - 0.51 K/uL    Baso (Absolute) 0.00 0.00 - 0.12 K/uL    NRBC (Absolute) 0.00 K/uL   Comp Metabolic Panel    Collection Time: 10/18/23  7:00 PM   Result Value Ref  Range    Sodium 137 135 - 145 mmol/L    Potassium 4.0 3.6 - 5.5 mmol/L    Chloride 103 96 - 112 mmol/L    Co2 21 20 - 33 mmol/L    Anion Gap 13.0 7.0 - 16.0    Glucose 101 (H) 65 - 99 mg/dL    Bun 7 (L) 8 - 22 mg/dL    Creatinine 0.72 0.50 - 1.40 mg/dL    Calcium 8.6 8.5 - 10.5 mg/dL    Correct Calcium 8.7 8.5 - 10.5 mg/dL    AST(SGOT) 212 (H) 12 - 45 U/L    ALT(SGPT) 354 (H) 2 - 50 U/L    Alkaline Phosphatase 604 (H) 30 - 99 U/L    Total Bilirubin 4.7 (H) 0.1 - 1.5 mg/dL    Albumin 3.9 3.2 - 4.9 g/dL    Total Protein 7.6 6.0 - 8.2 g/dL    Globulin 3.7 (H) 1.9 - 3.5 g/dL    A-G Ratio 1.1 g/dL   MAGNESIUM    Collection Time: 10/18/23  7:00 PM   Result Value Ref Range    Magnesium 2.2 1.5 - 2.5 mg/dL   PHOSPHORUS    Collection Time: 10/18/23  7:00 PM   Result Value Ref Range    Phosphorus 3.5 2.5 - 4.5 mg/dL   PROCALCITONIN    Collection Time: 10/18/23  7:00 PM   Result Value Ref Range    Procalcitonin 0.11 <0.25 ng/mL   LACTIC ACID    Collection Time: 10/18/23  7:00 PM   Result Value Ref Range    Lactic Acid 1.4 0.5 - 2.0 mmol/L   ESTIMATED GFR    Collection Time: 10/18/23  7:00 PM   Result Value Ref Range    GFR (CKD-EPI) 132 >60 mL/min/1.73 m 2   DIFFERENTIAL MANUAL    Collection Time: 10/18/23  7:00 PM   Result Value Ref Range    Other 9.40 %    Manual Diff Status PERFORMED     Comment See Comment    PERIPHERAL SMEAR REVIEW    Collection Time: 10/18/23  7:00 PM   Result Value Ref Range    Peripheral Smear Review see below    PATH INTERP HEME    Collection Time: 10/18/23  7:00 PM   Result Value Ref Range    Interpretation see below     Reviewed By Hematology see below    PLATELET ESTIMATE    Collection Time: 10/18/23  7:00 PM   Result Value Ref Range    Plt Estimation Normal    MORPHOLOGY    Collection Time: 10/18/23  7:00 PM   Result Value Ref Range    RBC Morphology Present     Smudge Cells Moderate    LACTIC ACID    Collection Time: 10/18/23 10:27 PM   Result Value Ref Range    Lactic Acid 1.4 0.5 - 2.0 mmol/L    URIC ACID    Collection Time: 10/18/23 10:27 PM   Result Value Ref Range    Uric Acid 4.3 2.5 - 8.3 mg/dL   LDH    Collection Time: 10/18/23 10:27 PM   Result Value Ref Range    LDH Total 631 (H) 107 - 266 U/L   HEPATITIS PANEL ACUTE(4 COMPONENTS)    Collection Time: 10/18/23 10:27 PM   Result Value Ref Range    Hepatitis B Surface Antigen Non-Reactive Non-Reactive    Hepatitis B Cors Ab,IgM Reactive (A) Non-Reactive    Hepatitis A Virus Ab, IgM Non-Reactive Non-Reactive    Hepatitis C Antibody Non-Reactive Non-Reactive   CREATINE KINASE    Collection Time: 10/18/23 10:27 PM   Result Value Ref Range    CPK Total 46 0 - 154 U/L   HIV AG/AB COMBO ASSAY SCREENING    Collection Time: 10/18/23 10:27 PM   Result Value Ref Range    HIV Ag/Ab Combo Assay Non-Reactive Non Reactive   Sed Rate    Collection Time: 10/18/23 10:27 PM   Result Value Ref Range    Sed Rate Westergren 5 0 - 20 mm/hour   FERRITIN    Collection Time: 10/18/23 10:27 PM   Result Value Ref Range    Ferritin 782.0 (H) 22.0 - 322.0 ng/mL   PROCALCITONIN    Collection Time: 10/18/23 10:27 PM   Result Value Ref Range    Procalcitonin 0.12 <0.25 ng/mL   IRON/TOTAL IRON BIND    Collection Time: 10/18/23 10:27 PM   Result Value Ref Range    Iron 89 50 - 180 ug/dL    Total Iron Binding 303 250 - 450 ug/dL    Unsat Iron Binding 214 110 - 370 ug/dL    % Saturation 29 15 - 55 %   CRP QUANTITIVE (NON-CARDIAC)    Collection Time: 10/18/23 10:27 PM   Result Value Ref Range    Stat C-Reactive Protein 0.69 0.00 - 0.75 mg/dL   PLATELET COUNT    Collection Time: 10/18/23 10:33 PM   Result Value Ref Range    Platelet Count 178 164 - 446 K/uL   D-DIMER    Collection Time: 10/18/23 10:33 PM   Result Value Ref Range    D-Dimer 2.82 (H) 0.00 - 0.50 ug/mL (FEU)   CBC with Differential    Collection Time: 10/19/23 12:03 AM   Result Value Ref Range    WBC 18.3 (H) 4.8 - 10.8 K/uL    RBC 5.41 4.70 - 6.10 M/uL    Hemoglobin 15.2 14.0 - 18.0 g/dL    Hematocrit 44.5 42.0 - 52.0 %     MCV 82.3 81.4 - 97.8 fL    MCH 28.1 27.0 - 33.0 pg    MCHC 34.2 32.3 - 36.5 g/dL    RDW 45.8 35.9 - 50.0 fL    Platelet Count 174 164 - 446 K/uL    MPV 9.2 9.0 - 12.9 fL    Neutrophils-Polys 7.60 (L) 44.00 - 72.00 %    Lymphocytes 86.40 (H) 22.00 - 41.00 %    Monocytes 3.40 0.00 - 13.40 %    Eosinophils 0.00 0.00 - 6.90 %    Basophils 0.90 0.00 - 1.80 %    Nucleated RBC 0.00 0.00 - 0.20 /100 WBC    Neutrophils (Absolute) 1.39 (L) 1.82 - 7.42 K/uL    Lymphs (Absolute) 15.81 (H) 1.00 - 4.80 K/uL    Monos (Absolute) 0.62 0.00 - 0.85 K/uL    Eos (Absolute) 0.00 0.00 - 0.51 K/uL    Baso (Absolute) 0.16 (H) 0.00 - 0.12 K/uL    NRBC (Absolute) 0.00 K/uL   Comp Metabolic Panel (CMP)    Collection Time: 10/19/23 12:03 AM   Result Value Ref Range    Sodium 135 135 - 145 mmol/L    Potassium 5.4 3.6 - 5.5 mmol/L    Chloride 101 96 - 112 mmol/L    Co2 26 20 - 33 mmol/L    Anion Gap 8.0 7.0 - 16.0    Glucose 102 (H) 65 - 99 mg/dL    Bun 8 8 - 22 mg/dL    Creatinine 0.85 0.50 - 1.40 mg/dL    Calcium 9.0 8.5 - 10.5 mg/dL    Correct Calcium 9.3 8.5 - 10.5 mg/dL    AST(SGOT) 200 (H) 12 - 45 U/L    ALT(SGPT) 359 (H) 2 - 50 U/L    Alkaline Phosphatase 579 (H) 30 - 99 U/L    Total Bilirubin 4.2 (H) 0.1 - 1.5 mg/dL    Albumin 3.6 3.2 - 4.9 g/dL    Total Protein 7.3 6.0 - 8.2 g/dL    Globulin 3.7 (H) 1.9 - 3.5 g/dL    A-G Ratio 1.0 g/dL   ESTIMATED GFR    Collection Time: 10/19/23 12:03 AM   Result Value Ref Range    GFR (CKD-EPI) 125 >60 mL/min/1.73 m 2   DIFFERENTIAL MANUAL    Collection Time: 10/19/23 12:03 AM   Result Value Ref Range    Other 1.70 %    Manual Diff Status PERFORMED    PERIPHERAL SMEAR REVIEW    Collection Time: 10/19/23 12:03 AM   Result Value Ref Range    Peripheral Smear Review see below    PLATELET ESTIMATE    Collection Time: 10/19/23 12:03 AM   Result Value Ref Range    Plt Estimation Normal    MORPHOLOGY    Collection Time: 10/19/23 12:03 AM   Result Value Ref Range    RBC Morphology Present     Smudge Cells  Moderate    BILIRUBIN DIRECT    Collection Time: 10/19/23 12:03 AM   Result Value Ref Range    Direct Bilirubin 2.8 (H) 0.1 - 0.5 mg/dL   Histology Request    Collection Time: 10/19/23 12:03 AM   Result Value Ref Range    Pathology Request Sent to Histo          Radiology Review:  CT-CHEST,ABDOMEN,PELVIS WITH   Final Result         1.  Hepatomegaly   2.  Splenomegaly   3.  Mediastinal, right hilar, tonya hepatis, and mild bilateral axillary adenopathy.            MDM (Data Review):   -Records reviewed and summarized in current documentation  -I personally reviewed and interpreted the laboratory results  -I personally reviewed the radiology images    Assessment/Recommendations:    Impression:   Acute hepatitis B:  hepatitis B core IgM with negative hepatitis B surface antigen--window period   Mixed hepatocellular/cholestatic injury.  Transaminases improved, bilirubin improved but alk phos up some  Hepatosplenomegaly with adenopathy  Lymphocytosis with moderate smudge cells      PLan   Check total HBV core antibody, HBV DNA already ordered   INR is the best indicator for prognosis.  His was  normal 10/17.  Will repeat in am  3.   Care is supportive in acute hep B and most go on to clear the infection.  Likelihood of fulminant disease is very low so no nucleoside treatment at this time.      Lavern Henriquez M.D.          Core Quality Measures   Reviewed items:  Labs, Medications and Radiology reports reviewed

## 2023-10-19 NOTE — H&P
Hospital Medicine History & Physical Note    Date of Service  10/18/2023    Primary Care Physician  Ricardo Lindquist D.N.P.    Consultants      Specialist Names:     Code Status  Full Code    Chief Complaint  Malaise, jaundice and vomiting    History of Presenting Illness  Gene Villanueva is a 22 y.o. male who presented 10/18/2023 with without any significant past medical history who comes into the hospital for nausea, vomiting, intermittent fevers for the past 2 weeks.  Patient had a trip to [place] where he was there for 2 weeks in the woods.  He did get bitten by mosquitoes during the trip.  When he came back he was starting to experience flulike symptoms.  He was started on amoxicillin for left ear infection.  Over the past 1 week he has had multiple episodes of vomiting and right upper quadrant abdominal pain.  He started to notice his eyes and urine change yellow.  He denies any excessive Tylenol use or alcohol abuse.  He denies any drug abuse or high risk sexual behaviors.  The patient states that his symptoms are improving.    The patient was transferred from [facility]  Found to have elevated liver enzymes, bilirubinemia, had a splenomegaly found on ultrasound  Hepatitis panel was negative  The patient was found to have smudge cells    I discussed the plan of care with patient.    Review of Systems  Review of Systems   Constitutional:  Positive for chills, fever and malaise/fatigue. Negative for diaphoresis.   HENT:  Negative for congestion, ear discharge, ear pain, hearing loss, nosebleeds, sinus pain, sore throat and tinnitus.    Eyes:  Negative for blurred vision, double vision, photophobia and pain.   Respiratory:  Negative for cough, hemoptysis, sputum production, shortness of breath, wheezing and stridor.    Cardiovascular:  Negative for chest pain, palpitations, orthopnea, claudication, leg swelling and PND.   Gastrointestinal:  Positive for abdominal pain, nausea and vomiting. Negative  for blood in stool, constipation, diarrhea, heartburn and melena.   Genitourinary:  Negative for dysuria, flank pain, frequency, hematuria and urgency.   Musculoskeletal:  Negative for back pain, falls, joint pain, myalgias and neck pain.   Skin:  Negative for itching and rash.   Neurological:  Negative for dizziness, tingling, tremors, weakness and headaches.   Endo/Heme/Allergies:  Negative for environmental allergies and polydipsia. Does not bruise/bleed easily.   Psychiatric/Behavioral:  Negative for depression, hallucinations, substance abuse and suicidal ideas.        Past Medical History   has a past medical history of Back pain, Infectious disease (02/2014), Nontraumatic rupture of extensor tendons of hand and wrist (04/04/2014), Osteomyelitis (HCC) (02/23/2014), and Surgery, elective.    Surgical History   has a past surgical history that includes extensor tendon repair (4/4/2014).     Family History  family history includes No Known Problems in his brother, brother, brother, father, and mother.   Family history reviewed with patient. There is no family history that is pertinent to the chief complaint.     Social History   reports that he has never smoked. He has never used smokeless tobacco. He reports current alcohol use of about 1.2 oz of alcohol per week. He reports current drug use. Frequency: 21.00 times per week. Drugs: Marijuana and Inhaled.    Allergies  Allergies   Allergen Reactions    Other Environmental Runny Nose     Seasonal allergies, dry, or runny nose, itchy and dry eyes    Acetaminophen Unspecified     Elevated liver enzymes       Medications  None       Physical Exam  Temp:  [37.6 °C (99.6 °F)] 37.6 °C (99.6 °F)  Pulse:  [96] 96  Resp:  [20] 20  BP: (130)/(75) 130/75  SpO2:  [96 %] 96 %  Blood Pressure: 130/75   Temperature: 37.6 °C (99.6 °F)   Pulse: 96   Respiration: 20   Pulse Oximetry: 96 %       Physical Exam  Vitals and nursing note reviewed.   Constitutional:       General: He is  "not in acute distress.     Appearance: Normal appearance. He is not ill-appearing, toxic-appearing or diaphoretic.   HENT:      Head: Normocephalic and atraumatic.      Nose: No congestion or rhinorrhea.      Mouth/Throat:      Pharynx: No posterior oropharyngeal erythema.   Eyes:      General: Scleral icterus present.         Right eye: No discharge.   Cardiovascular:      Rate and Rhythm: Normal rate and regular rhythm.      Pulses: Normal pulses.      Heart sounds: Normal heart sounds. No murmur heard.     No friction rub. No gallop.   Pulmonary:      Effort: Pulmonary effort is normal. No respiratory distress.      Breath sounds: Normal breath sounds. No stridor. No wheezing, rhonchi or rales.   Abdominal:      General: There is distension.      Tenderness: There is abdominal tenderness.   Musculoskeletal:         General: No swelling, tenderness, deformity or signs of injury.      Cervical back: Normal range of motion.      Right lower leg: No edema.      Left lower leg: No edema.   Skin:     Capillary Refill: Capillary refill takes more than 3 seconds.      Coloration: Skin is not jaundiced or pale.      Findings: No bruising, erythema, lesion or rash.   Neurological:      General: No focal deficit present.      Mental Status: He is alert and oriented to person, place, and time.         Laboratory:          No results for input(s): \"ALTSGPT\", \"ASTSGOT\", \"ALKPHOSPHAT\", \"TBILIRUBIN\", \"DBILIRUBIN\", \"GAMMAGT\", \"AMYLASE\", \"LIPASE\", \"ALB\", \"PREALBUMIN\", \"GLUCOSE\" in the last 72 hours.      No results for input(s): \"NTPROBNP\" in the last 72 hours.      No results for input(s): \"TROPONINT\" in the last 72 hours.    Imaging:  CT-CHEST,ABDOMEN,PELVIS WITH    (Results Pending)       no X-Ray or EKG requiring interpretation    Assessment/Plan:  Justification for Admission Status  I anticipate this patient will require at least two midnights for appropriate medical management, necessitating inpatient admission because acute " liver failure    Patient will need a Med/Surg bed on ONCOLOGY service .  The need is secondary to acute liver failure.    * Acute liver failure  Assessment & Plan  Unknown etiology possibly lymphoma with hepatosplenomegaly and B symptoms  I have ordered LUCY, INR, ammonia, anti-smooth muscle antibody, ESR, CRP, uric acid, LDH, chronic hepatitis, direct bilirubin, flow cytometry, iron panel  HIDA scan and MRCP were negative    Leukocytosis  Assessment & Plan  Hold off on antibiotics for now  Check procalcitonin    Splenomegaly- (present on admission)  Assessment & Plan  I have ordered a CT scan of the chest abdomen pelvis          VTE prophylaxis: SCDs/TEDs

## 2023-10-19 NOTE — DISCHARGE PLANNING
Case Management Discharge Planning    Admission Date: 10/18/2023  GMLOS: 2.5  ALOS: 1    TRANSFER BACK PATIENT    Referring Facility: [facility]  Reason for Transfer: Oncology consult    Signed Repatriation/Transfer Back Agreement saved in .    Once this patient has received the requested service or the patient no longer requires tertiary level of care from [facility] and is stable to transfer back to referring facility, we can facilitate a transfer back. Please contact the [facility] at [phone] to coordinate this transfer request.

## 2023-10-20 LAB
ALBUMIN SERPL BCP-MCNC: 3.6 G/DL (ref 3.2–4.9)
ALBUMIN/GLOB SERPL: 0.9 G/DL
ALP SERPL-CCNC: 568 U/L (ref 30–99)
ALT SERPL-CCNC: 313 U/L (ref 2–50)
ANION GAP SERPL CALC-SCNC: 10 MMOL/L (ref 7–16)
APTT PPP: 38.6 SEC (ref 24.7–36)
AST SERPL-CCNC: 180 U/L (ref 12–45)
BILIRUB SERPL-MCNC: 3 MG/DL (ref 0.1–1.5)
BUN SERPL-MCNC: 11 MG/DL (ref 8–22)
CALCIUM ALBUM COR SERPL-MCNC: 8.9 MG/DL (ref 8.5–10.5)
CALCIUM SERPL-MCNC: 8.6 MG/DL (ref 8.5–10.5)
CHLORIDE SERPL-SCNC: 102 MMOL/L (ref 96–112)
CO2 SERPL-SCNC: 23 MMOL/L (ref 20–33)
CREAT SERPL-MCNC: 0.84 MG/DL (ref 0.5–1.4)
ERYTHROCYTE [DISTWIDTH] IN BLOOD BY AUTOMATED COUNT: 46.5 FL (ref 35.9–50)
GFR SERPLBLD CREATININE-BSD FMLA CKD-EPI: 126 ML/MIN/1.73 M 2
GLOBULIN SER CALC-MCNC: 3.9 G/DL (ref 1.9–3.5)
GLUCOSE SERPL-MCNC: 104 MG/DL (ref 65–99)
HAPTOGLOB SERPL-MCNC: <10 MG/DL (ref 30–200)
HBV CORE AB SERPL QL IA: NONREACTIVE
HCT VFR BLD AUTO: 44.6 % (ref 42–52)
HGB BLD-MCNC: 15.2 G/DL (ref 14–18)
INR PPP: 1.17 (ref 0.87–1.13)
MAGNESIUM SERPL-MCNC: 2.3 MG/DL (ref 1.5–2.5)
MCH RBC QN AUTO: 27.9 PG (ref 27–33)
MCHC RBC AUTO-ENTMCNC: 34.1 G/DL (ref 32.3–36.5)
MCV RBC AUTO: 82 FL (ref 81.4–97.8)
NUCLEAR IGG SER QL IA: DETECTED
PHOSPHATE SERPL-MCNC: 4.4 MG/DL (ref 2.5–4.5)
PLATELET # BLD AUTO: 197 K/UL (ref 164–446)
PMV BLD AUTO: 9.1 FL (ref 9–12.9)
POTASSIUM SERPL-SCNC: 4.2 MMOL/L (ref 3.6–5.5)
PROT SERPL-MCNC: 7.5 G/DL (ref 6–8.2)
PROTHROMBIN TIME: 15 SEC (ref 12–14.6)
RBC # BLD AUTO: 5.44 M/UL (ref 4.7–6.1)
SODIUM SERPL-SCNC: 135 MMOL/L (ref 135–145)
WBC # BLD AUTO: 18.9 K/UL (ref 4.8–10.8)

## 2023-10-20 PROCEDURE — 770004 HCHG ROOM/CARE - ONCOLOGY PRIVATE *

## 2023-10-20 PROCEDURE — 99232 SBSQ HOSP IP/OBS MODERATE 35: CPT | Performed by: STUDENT IN AN ORGANIZED HEALTH CARE EDUCATION/TRAINING PROGRAM

## 2023-10-20 PROCEDURE — 85027 COMPLETE CBC AUTOMATED: CPT

## 2023-10-20 PROCEDURE — 84100 ASSAY OF PHOSPHORUS: CPT

## 2023-10-20 PROCEDURE — 99232 SBSQ HOSP IP/OBS MODERATE 35: CPT | Performed by: NURSE PRACTITIONER

## 2023-10-20 PROCEDURE — 36415 COLL VENOUS BLD VENIPUNCTURE: CPT

## 2023-10-20 PROCEDURE — 85730 THROMBOPLASTIN TIME PARTIAL: CPT

## 2023-10-20 PROCEDURE — 83735 ASSAY OF MAGNESIUM: CPT

## 2023-10-20 PROCEDURE — 86704 HEP B CORE ANTIBODY TOTAL: CPT

## 2023-10-20 PROCEDURE — 80053 COMPREHEN METABOLIC PANEL: CPT

## 2023-10-20 PROCEDURE — 85610 PROTHROMBIN TIME: CPT

## 2023-10-20 PROCEDURE — 700111 HCHG RX REV CODE 636 W/ 250 OVERRIDE (IP): Mod: JZ | Performed by: STUDENT IN AN ORGANIZED HEALTH CARE EDUCATION/TRAINING PROGRAM

## 2023-10-20 RX ORDER — ENOXAPARIN SODIUM 100 MG/ML
40 INJECTION SUBCUTANEOUS DAILY
Status: DISCONTINUED | OUTPATIENT
Start: 2023-10-20 | End: 2023-10-24 | Stop reason: HOSPADM

## 2023-10-20 RX ADMIN — ENOXAPARIN SODIUM 40 MG: 100 INJECTION SUBCUTANEOUS at 17:06

## 2023-10-20 ASSESSMENT — ENCOUNTER SYMPTOMS
WEAKNESS: 0
FEVER: 0
CONSTIPATION: 0
NAUSEA: 1
BLOOD IN STOOL: 0
FALLS: 0
VOMITING: 0
ABDOMINAL PAIN: 1
NERVOUS/ANXIOUS: 0
MYALGIAS: 0
SHORTNESS OF BREATH: 0
DIARRHEA: 0
HEADACHES: 0
INSOMNIA: 0
FLANK PAIN: 0
SORE THROAT: 0
COUGH: 0

## 2023-10-20 NOTE — PROGRESS NOTES
Gastroenterology Progress Note               Author:  KADEN Schwartz Date & Time Created: 10/20/2023 4:27 PM       Patient ID:  Name:             Gene Villanueva    YOB: 2000  Age:                 22 y.o.  male  MRN:               [id number]        Medical Decision Making, by Problem:  Active Hospital Problems    Diagnosis     Acute hepatitis B [B16.9]     Bilirubinemia [E80.6]     Splenomegaly [R16.1]     Elevated liver enzymes [R74.8]     Lymphocytosis [D72.820]            Presenting Chief Complaint:  HBV IgM +        History of Present Illness:    This is a  22 y.o. male presented to [facility] on October 14 with nausea and vomiting, intermittent fevers, LUQ worse with deep inspiration and his mother noted him to be jaundiced the day prior.  On presentation, his , , alkaline phosphatase 437 and bilirubin 6.3.  He was initially thought to have alcohol induced liver disease but CBC showed elevated WBC and atypical lymphs.  LDH was 650.  MRCP and HIDA results not available but US abd showed spleen 16.5cm otherwise normal.     History notable for recent trip to [place] (Sept 9-Sept 16 and was in the woods for 2 weeks and did have mosquito bites. He 2 days of feeling unwell before he left but definitely had flulike symptoms when he returned.  He was seen in urgent care and given amoxicillin for left ear infection.  Dr. Short was able to elicit a history of male male sexual encounter earlier this month and patient stated it was unprotected oral sex.       Current labs:  WBC 18.3, plt 174, 86.4% lymph, moderate smudge cells  , , , bilirubin 4.2  HAV IgM neg, HBsAg neg, HBcore IgM positive, HCV Ab neg  HIV NR  CT chest/abd/pel with:  mediastinal and right hilar lymph nodes, bilat axillary nodes, hepatomegaly, splenomegaly, tonya hepatis nodes up to 1cm         Interval History:  10/20/2023: Patient seen at bedside.  Reports some abdominal tightness  and nausea if he eats too fast. He states he is tolerating eating more in his meals. He also states he's been told his eyes are looking less yellow. LFt's down trending. Hep B quant pending.         Hospital Medications:  Current Facility-Administered Medications   Medication Dose Frequency Provider Last Rate Last Admin    ondansetron (Zofran) syringe/vial injection 4 mg  4 mg Q4HRS PRN Jitendra Del Angel M.D.        ondansetron (Zofran ODT) dispertab 4 mg  4 mg Q4HRS PRN Jitendra Del Angel M.D.        promethazine (Phenergan) tablet 12.5-25 mg  12.5-25 mg Q4HRS PRОЛЬГА Del Angel M.D.        promethazine (Phenergan) suppository 12.5-25 mg  12.5-25 mg Q4HRS PRN Jitendra Del Angel M.D.        prochlorperazine (Compazine) injection 5-10 mg  5-10 mg Q4HRS PRОЛЬГА Del Angel M.D.       Last reviewed on 10/18/2023  9:18 PM by Tip Garcia R.N.       Review of Systems:  Review of Systems   Constitutional:  Negative for fever and malaise/fatigue.   HENT:  Negative for congestion and sore throat.    Respiratory:  Negative for cough and shortness of breath.    Cardiovascular:  Negative for chest pain and leg swelling.   Gastrointestinal:  Positive for abdominal pain (tightness) and nausea (improved, with eating too fast). Negative for blood in stool, constipation, diarrhea, melena and vomiting.   Genitourinary:  Negative for dysuria and flank pain.   Musculoskeletal:  Negative for falls and myalgias.   Neurological:  Negative for weakness and headaches.   Psychiatric/Behavioral:  The patient is not nervous/anxious and does not have insomnia.    All other systems reviewed and are negative.        Vital signs:  Weight/BMI: Body mass index is 24.58 kg/m².  /67   Pulse 82   Temp 36.4 °C (97.5 °F) (Temporal)   Resp 16   Wt 71.2 kg (156 lb 15.5 oz)   SpO2 96%   Vitals:    10/19/23 1500 10/2000 10/20/23 0452 10/20/23 0809   BP:  128/76 129/71 122/67   Pulse:  (!) 101 72 82   Resp:  16 14 16   Temp:  36.6 °C (97.9 °F) 37 °C (98.6  °F) 36.4 °C (97.5 °F)   TempSrc:  Temporal Temporal Temporal   SpO2:  95% 97% 96%   Weight: 71.2 kg (156 lb 15.5 oz)        Oxygen Therapy:  Pulse Oximetry: 96 %, O2 (LPM): 0, O2 Delivery Device: None - Room Air    Intake/Output Summary (Last 24 hours) at 10/20/2023 1627  Last data filed at 10/20/2023 0900  Gross per 24 hour   Intake 120 ml   Output --   Net 120 ml         Physical Exam:  Physical Exam  Vitals and nursing note reviewed.   Constitutional:       General: He is awake. He is not in acute distress.     Appearance: He is well-developed, well-groomed and normal weight.   HENT:      Head: Normocephalic.      Nose: Nose normal. No congestion.      Mouth/Throat:      Mouth: Mucous membranes are moist.      Pharynx: Oropharynx is clear. No oropharyngeal exudate.   Eyes:      General: Scleral icterus present.      Extraocular Movements: Extraocular movements intact.      Conjunctiva/sclera: Conjunctivae normal.   Neck:      Comments: Palpable cervical adenopathy  Cardiovascular:      Rate and Rhythm: Normal rate and regular rhythm.      Pulses: Normal pulses.      Heart sounds: Normal heart sounds. No murmur heard.  Pulmonary:      Effort: Pulmonary effort is normal. No respiratory distress.      Breath sounds: Normal breath sounds.   Abdominal:      General: Abdomen is flat. Bowel sounds are normal. There is no distension.      Palpations: Abdomen is soft.      Tenderness: There is no abdominal tenderness. There is no guarding.   Musculoskeletal:      Right lower leg: No edema.      Left lower leg: No edema.   Skin:     General: Skin is warm and dry.      Capillary Refill: Capillary refill takes less than 2 seconds.      Coloration: Skin is not jaundiced.   Neurological:      General: No focal deficit present.      Mental Status: He is alert and oriented to person, place, and time. Mental status is at baseline.   Psychiatric:         Mood and Affect: Mood normal.         Behavior: Behavior normal. Behavior is  cooperative.         Thought Content: Thought content normal.         Judgment: Judgment normal.             Labs:  Recent Labs     10/18/23  1900 10/19/23  0003 10/20/23  0656   SODIUM 137 135 135   POTASSIUM 4.0 5.4 4.2   CHLORIDE 103 101 102   CO2 21 26 23   BUN 7* 8 11   CREATININE 0.72 0.85 0.84   MAGNESIUM 2.2  --  2.3   PHOSPHORUS 3.5  --  4.4   CALCIUM 8.6 9.0 8.6     Recent Labs     10/18/23  1900 10/19/23  0003 10/20/23  0656   ALTSGPT 354* 359* 313*   ASTSGOT 212* 200* 180*   ALKPHOSPHAT 604* 579* 568*   TBILIRUBIN 4.7* 4.2* 3.0*   DBILIRUBIN  --  2.8*  --    GLUCOSE 101* 102* 104*     Recent Labs     10/18/23  1900 10/19/23  0003 10/20/23  0656   WBC 22.2* 18.3* 18.9*   NEUTSPOLYS 6.00* 7.60*  --    LYMPHOCYTES 82.90* 86.40*  --    MONOCYTES 1.70 3.40  --    EOSINOPHILS 0.00 0.00  --    BASOPHILS 0.00 0.90  --    ASTSGOT 212* 200* 180*   ALTSGPT 354* 359* 313*   ALKPHOSPHAT 604* 579* 568*   TBILIRUBIN 4.7* 4.2* 3.0*     Recent Labs     10/18/23  1900 10/18/23  2227 10/18/23  2233 10/19/23  0003 10/20/23  0007 10/20/23  0656   RBC 5.25  --   --  5.41  --  5.44   HEMOGLOBIN 15.2  --   --  15.2  --  15.2   HEMATOCRIT 43.2  --   --  44.5  --  44.6   PLATELETCT 176  --  178 174  --  197   PROTHROMBTM  --   --   --   --  15.0*  --    APTT  --   --   --   --  38.6*  --    INR  --   --   --   --  1.17*  --    IRON  --  89  --   --   --   --    FERRITIN  --  782.0*  --   --   --   --    TOTIRONBC  --  303  --   --   --   --      Recent Results (from the past 24 hour(s))   Prothrombin Time    Collection Time: 10/20/23 12:07 AM   Result Value Ref Range    PT 15.0 (H) 12.0 - 14.6 sec    INR 1.17 (H) 0.87 - 1.13   HEP B CORE AB TOTAL    Collection Time: 10/20/23 12:07 AM   Result Value Ref Range    Hepatitis B Core Ab, Total NonReactive Non-Reactive   APTT    Collection Time: 10/20/23 12:07 AM   Result Value Ref Range    APTT 38.6 (H) 24.7 - 36.0 sec   CBC WITHOUT DIFFERENTIAL    Collection Time: 10/20/23  6:56 AM    Result Value Ref Range    WBC 18.9 (H) 4.8 - 10.8 K/uL    RBC 5.44 4.70 - 6.10 M/uL    Hemoglobin 15.2 14.0 - 18.0 g/dL    Hematocrit 44.6 42.0 - 52.0 %    MCV 82.0 81.4 - 97.8 fL    MCH 27.9 27.0 - 33.0 pg    MCHC 34.1 32.3 - 36.5 g/dL    RDW 46.5 35.9 - 50.0 fL    Platelet Count 197 164 - 446 K/uL    MPV 9.1 9.0 - 12.9 fL   Comp Metabolic Panel    Collection Time: 10/20/23  6:56 AM   Result Value Ref Range    Sodium 135 135 - 145 mmol/L    Potassium 4.2 3.6 - 5.5 mmol/L    Chloride 102 96 - 112 mmol/L    Co2 23 20 - 33 mmol/L    Anion Gap 10.0 7.0 - 16.0    Glucose 104 (H) 65 - 99 mg/dL    Bun 11 8 - 22 mg/dL    Creatinine 0.84 0.50 - 1.40 mg/dL    Calcium 8.6 8.5 - 10.5 mg/dL    Correct Calcium 8.9 8.5 - 10.5 mg/dL    AST(SGOT) 180 (H) 12 - 45 U/L    ALT(SGPT) 313 (H) 2 - 50 U/L    Alkaline Phosphatase 568 (H) 30 - 99 U/L    Total Bilirubin 3.0 (H) 0.1 - 1.5 mg/dL    Albumin 3.6 3.2 - 4.9 g/dL    Total Protein 7.5 6.0 - 8.2 g/dL    Globulin 3.9 (H) 1.9 - 3.5 g/dL    A-G Ratio 0.9 g/dL   MAGNESIUM    Collection Time: 10/20/23  6:56 AM   Result Value Ref Range    Magnesium 2.3 1.5 - 2.5 mg/dL   PHOSPHORUS    Collection Time: 10/20/23  6:56 AM   Result Value Ref Range    Phosphorus 4.4 2.5 - 4.5 mg/dL   ESTIMATED GFR    Collection Time: 10/20/23  6:56 AM   Result Value Ref Range    GFR (CKD-EPI) 126 >60 mL/min/1.73 m 2       Radiology Review:  CT-CHEST,ABDOMEN,PELVIS WITH   Final Result         1.  Hepatomegaly   2.  Splenomegaly   3.  Mediastinal, right hilar, tonya hepatis, and mild bilateral axillary adenopathy.            MDM (Data Review):   -Records reviewed and summarized in current documentation  -I personally reviewed and interpreted the laboratory results  -I personally reviewed the radiology images    Assessment/Recommendations:  Impression:   Acute hepatitis B:  hepatitis B core IgM with negative hepatitis B surface antigen--window period   Mixed hepatocellular/cholestatic injury.  Transaminases  improved, bilirubin improved but alk phos up some  Hepatosplenomegaly with adenopathy  Lymphocytosis with moderate smudge cells        Plan   total HBV core antibody nonreactive   HBV quant pending   INR is the best indicator for prognosis.  His was  normal 10/17.  Mildly elevated at 1.17  Care is supportive in acute hep B and most go on to clear the infection.  Likelihood of fulminant disease is very low so no nucleoside treatment at this time.     GI to follow.      Core Quality Measures   Reviewed items::  Labs, Medications and Radiology reports reviewed

## 2023-10-20 NOTE — PROGRESS NOTES
Hospital Medicine Daily Progress Note    Date of Service  10/20/2023    Chief Complaint  Gene Villanueva is a 22 y.o. male admitted 10/18/2023 with transferred from [facility] for organomegaly with lymphocytosis.    Hospital Course  22-year-old white male who was transferred from [facility] for further evaluation of organomegaly with lymphocytosis.  Reported nausea, vomiting, weight loss. recent ear infection was treated on antibiotics.  He had a swollen lymph node in the right angle of the jaw.  Over the past week or so he noticed little bit more discomfort in the right upper quadrant and also became jaundiced.  He denies any alcohol abuse.  He denies drug IV abuse except using marijuana.  He did admit to unprotected male male sex early October.     The patient reports having infection of osteomyelitis in his right shoulder when he was younger.  He said he was told it was from drinking pond water.  He also lost the top of his second toe secondary to sliding into the lawn more on multiple occasions while he was trying to practice for baseball.     He does not really report any history that he was told that he ever had mononucleosis.    Per chart review, he had lymphocytosis in 2014 (WBC 7, lymphocyte 58%), EBV IgG was positive at that time.  He said he has not had any follow-up since then.    Interval Problem Update  Patient was seen and examined at bedside  Patient feels better    HBV IgM positive, HBV core antibody negative, HBV PCR pending  Elevated liver enzyme and bilirubin -trending down    LUCY positive    Significant lymphocytosis  Elevated LDH and D-dimer  CT abdomen showed hepatomegaly, splenomegaly, Mediastinal, right hilar, tonya hepatis, and mild bilateral axillary adenopathy.    I discussed with oncology Dr. Short, ordered flow cytometry, may consider bone marrow if indicated    I have discussed this patient's plan of care and discharge plan at IDT rounds today with  Case Management, Nursing, Nursing leadership, and other members of the IDT team.    Consultants/Specialty  Oncology, GI    Code Status  Full Code    Disposition  The patient is not medically cleared for discharge to home or a post-acute facility.      I have placed the appropriate orders for post-discharge needs.    Review of Systems  All 12 systems were reviewed and negative except as mentioned above      Physical Exam  Temp:  [36.4 °C (97.5 °F)-37 °C (98.6 °F)] 36.4 °C (97.5 °F)  Pulse:  [] 82  Resp:  [14-16] 16  BP: (122-129)/(67-76) 122/67  SpO2:  [95 %-97 %] 96 %    Physical Exam  HENT:      Head: Normocephalic.      Nose: Nose normal.      Mouth/Throat:      Mouth: Mucous membranes are moist.   Eyes:      Extraocular Movements: Extraocular movements intact.      Conjunctiva/sclera: Conjunctivae normal.   Cardiovascular:      Rate and Rhythm: Normal rate and regular rhythm.      Pulses: Normal pulses.      Heart sounds: Normal heart sounds.   Pulmonary:      Effort: Pulmonary effort is normal.      Breath sounds: Normal breath sounds. No wheezing or rales.   Abdominal:      General: Bowel sounds are normal.      Palpations: Abdomen is soft.      Tenderness: There is no abdominal tenderness. There is no guarding.      Comments: Splenomegaly, mild tenderness   Musculoskeletal:         General: No swelling or tenderness. Normal range of motion.      Cervical back: Normal range of motion and neck supple.   Lymphadenopathy:      Cervical: Cervical adenopathy present.      Left cervical: Superficial cervical adenopathy present.   Skin:     General: Skin is warm.   Neurological:      Mental Status: He is alert and oriented to person, place, and time. Mental status is at baseline.   Psychiatric:         Mood and Affect: Mood normal.         Fluids    Intake/Output Summary (Last 24 hours) at 10/20/2023 1628  Last data filed at 10/20/2023 0900  Gross per 24 hour   Intake 120 ml   Output --   Net 120 ml        Laboratory  Recent Labs     10/18/23  1900 10/18/23  2233 10/19/23  0003 10/20/23  0656   WBC 22.2*  --  18.3* 18.9*   RBC 5.25  --  5.41 5.44   HEMOGLOBIN 15.2  --  15.2 15.2   HEMATOCRIT 43.2  --  44.5 44.6   MCV 82.3  --  82.3 82.0   MCH 29.0  --  28.1 27.9   MCHC 35.2  --  34.2 34.1   RDW 44.1  --  45.8 46.5   PLATELETCT 176 178 174 197   MPV 8.8*  --  9.2 9.1     Recent Labs     10/18/23  1900 10/19/23  0003 10/20/23  0656   SODIUM 137 135 135   POTASSIUM 4.0 5.4 4.2   CHLORIDE 103 101 102   CO2 21 26 23   GLUCOSE 101* 102* 104*   BUN 7* 8 11   CREATININE 0.72 0.85 0.84   CALCIUM 8.6 9.0 8.6     Recent Labs     10/20/23  0007   APTT 38.6*   INR 1.17*               Imaging  CT-CHEST,ABDOMEN,PELVIS WITH   Final Result         1.  Hepatomegaly   2.  Splenomegaly   3.  Mediastinal, right hilar, tonya hepatis, and mild bilateral axillary adenopathy.           Assessment/Plan  * Elevated liver enzymes  Assessment & Plan  Likely due to acute hepatitis B, HBV IgM positive  Pending LUCY, INR, ammonia, anti-smooth muscle antibody, ESR, CRP, uric acid, LDH, chronic hepatitis, direct bilirubin, flow cytometry, iron panel  HIDA scan and MRCP were negative        Acute hepatitis B  Assessment & Plan  HBV IgM positive  HBV core antibody negative  Elevated liver enzyme and bilirubin      LUCY positive     HBV virus load pending    I discussed with GI    Lymphocytosis  Assessment & Plan  WBC 22, lymphocytes dominate 86%  Hold off on antibiotics for now  procalcitonin 0.11    Pending work-up for lymphoma/AL L      Splenomegaly- (present on admission)  Assessment & Plan  CT abdomen showed hepatomegaly, splenomegaly, Mediastinal, right hilar, tonya hepatis, and mild bilateral axillary adenopathy.  Significant lymphocytosis    Per chart review, he had lymphocytosis in 2014 (WBC 7, lymphocyte 58%), EBV IgG was positive at that time.  He said he has not had any follow-up since then.    Pending work-up for lymphoma/ALL  I  discussed with oncology      Bilirubinemia  Assessment & Plan  Likely due to hepatitis  No obstruction or bile duct dilation on CT    Monitor         VTE prophylaxis: SCD, lovenox    I have performed a physical exam and reviewed and updated ROS and Plan today (10/20/2023). In review of yesterday's note (10/19/2023), there are no changes except as documented above.

## 2023-10-21 PROBLEM — B17.9 ACUTE HEPATITIS: Status: ACTIVE | Noted: 2023-10-18

## 2023-10-21 LAB
ACUTE LEUKEMIA MARKERS SPEC-IMP: NORMAL
ALBUMIN SERPL BCP-MCNC: 4 G/DL (ref 3.2–4.9)
ALBUMIN/GLOB SERPL: 1 G/DL
ALP SERPL-CCNC: 575 U/L (ref 30–99)
ALT SERPL-CCNC: 321 U/L (ref 2–50)
ANION GAP SERPL CALC-SCNC: 9 MMOL/L (ref 7–16)
ANISOCYTOSIS BLD QL SMEAR: ABNORMAL
AST SERPL-CCNC: 182 U/L (ref 12–45)
BASOPHILS # BLD AUTO: 0 % (ref 0–1.8)
BASOPHILS # BLD: 0 K/UL (ref 0–0.12)
BILIRUB SERPL-MCNC: 2.6 MG/DL (ref 0.1–1.5)
BUN SERPL-MCNC: 13 MG/DL (ref 8–22)
CALCIUM ALBUM COR SERPL-MCNC: 9 MG/DL (ref 8.5–10.5)
CALCIUM SERPL-MCNC: 9 MG/DL (ref 8.5–10.5)
CHLORIDE SERPL-SCNC: 99 MMOL/L (ref 96–112)
CO2 SERPL-SCNC: 27 MMOL/L (ref 20–33)
COMMENT NL1176: NORMAL
CREAT SERPL-MCNC: 0.8 MG/DL (ref 0.5–1.4)
EOSINOPHIL # BLD AUTO: 0.19 K/UL (ref 0–0.51)
EOSINOPHIL NFR BLD: 0.9 % (ref 0–6.9)
ERYTHROCYTE [DISTWIDTH] IN BLOOD BY AUTOMATED COUNT: 48.4 FL (ref 35.9–50)
EVENTS COUNTED SPEC: 32 MARKERS
GFR SERPLBLD CREATININE-BSD FMLA CKD-EPI: 128 ML/MIN/1.73 M 2
GLOBULIN SER CALC-MCNC: 3.9 G/DL (ref 1.9–3.5)
GLUCOSE SERPL-MCNC: 99 MG/DL (ref 65–99)
HBV DNA SERPL NAA+PROBE-ACNC: ABNORMAL IU/ML
HBV DNA SERPL NAA+PROBE-ACNC: NOT DETECTED IU/ML
HBV DNA SERPL NAA+PROBE-LOG IU: <1 LOG IU/ML
HBV DNA SERPL NAA+PROBE-LOG IU: NOT DETECTED LOG IU/ML
HBV DNA SERPL QL NAA+PROBE: DETECTED
HBV DNA SERPL QL NAA+PROBE: NOT DETECTED
HCT VFR BLD AUTO: 48.3 % (ref 42–52)
HGB BLD-MCNC: 16.1 G/DL (ref 14–18)
LYMPHOCYTES # BLD AUTO: 18.38 K/UL (ref 1–4.8)
LYMPHOCYTES NFR BLD: 85.1 % (ref 22–41)
MACROCYTES BLD QL SMEAR: ABNORMAL
MANUAL DIFF BLD: NORMAL
MCH RBC QN AUTO: 27.7 PG (ref 27–33)
MCHC RBC AUTO-ENTMCNC: 33.3 G/DL (ref 32.3–36.5)
MCV RBC AUTO: 83.1 FL (ref 81.4–97.8)
MONOCYTES # BLD AUTO: 1.32 K/UL (ref 0–0.85)
MONOCYTES NFR BLD AUTO: 6.1 % (ref 0–13.4)
MORPHOLOGY BLD-IMP: NORMAL
NEUTROPHILS # BLD AUTO: 1.71 K/UL (ref 1.82–7.42)
NEUTROPHILS NFR BLD: 7.9 % (ref 44–72)
NRBC # BLD AUTO: 0 K/UL
NRBC BLD-RTO: 0 /100 WBC (ref 0–0.2)
PLATELET # BLD AUTO: 233 K/UL (ref 164–446)
PLATELET BLD QL SMEAR: NORMAL
PMV BLD AUTO: 9.7 FL (ref 9–12.9)
POIKILOCYTOSIS BLD QL SMEAR: NORMAL
POTASSIUM SERPL-SCNC: 4.9 MMOL/L (ref 3.6–5.5)
PROT SERPL-MCNC: 7.9 G/DL (ref 6–8.2)
RBC # BLD AUTO: 5.81 M/UL (ref 4.7–6.1)
RBC BLD AUTO: PRESENT
SMUDGE CELLS BLD QL SMEAR: NORMAL
SODIUM SERPL-SCNC: 135 MMOL/L (ref 135–145)
SOURCE 9121: NORMAL
WBC # BLD AUTO: 21.6 K/UL (ref 4.8–10.8)

## 2023-10-21 PROCEDURE — 85027 COMPLETE CBC AUTOMATED: CPT

## 2023-10-21 PROCEDURE — 36415 COLL VENOUS BLD VENIPUNCTURE: CPT

## 2023-10-21 PROCEDURE — 700102 HCHG RX REV CODE 250 W/ 637 OVERRIDE(OP)

## 2023-10-21 PROCEDURE — 99232 SBSQ HOSP IP/OBS MODERATE 35: CPT | Performed by: STUDENT IN AN ORGANIZED HEALTH CARE EDUCATION/TRAINING PROGRAM

## 2023-10-21 PROCEDURE — 85007 BL SMEAR W/DIFF WBC COUNT: CPT

## 2023-10-21 PROCEDURE — A9270 NON-COVERED ITEM OR SERVICE: HCPCS

## 2023-10-21 PROCEDURE — 770004 HCHG ROOM/CARE - ONCOLOGY PRIVATE *

## 2023-10-21 PROCEDURE — 700111 HCHG RX REV CODE 636 W/ 250 OVERRIDE (IP): Mod: JZ | Performed by: STUDENT IN AN ORGANIZED HEALTH CARE EDUCATION/TRAINING PROGRAM

## 2023-10-21 PROCEDURE — 80053 COMPREHEN METABOLIC PANEL: CPT

## 2023-10-21 RX ORDER — IBUPROFEN 400 MG/1
400 TABLET, FILM COATED ORAL EVERY 6 HOURS PRN
Status: DISCONTINUED | OUTPATIENT
Start: 2023-10-21 | End: 2023-10-24 | Stop reason: HOSPADM

## 2023-10-21 RX ADMIN — ENOXAPARIN SODIUM 40 MG: 100 INJECTION SUBCUTANEOUS at 16:45

## 2023-10-21 RX ADMIN — IBUPROFEN 400 MG: 400 TABLET, FILM COATED ORAL at 22:49

## 2023-10-21 ASSESSMENT — PAIN DESCRIPTION - PAIN TYPE
TYPE: ACUTE PAIN

## 2023-10-21 ASSESSMENT — FIBROSIS 4 INDEX: FIB4 SCORE: 0.96

## 2023-10-21 NOTE — CARE PLAN
Problem: Knowledge Deficit - Standard  Goal: Patient and family/care givers will demonstrate understanding of plan of care, disease process/condition, diagnostic tests and medications  Outcome: Progressing     Problem: Gastrointestinal Irritability  Goal: Nausea and vomiting will be absent or improve  Outcome: Progressing     The patient is Watcher - Medium risk of patient condition declining or worsening    Shift Goals  Clinical Goals: Lab work up results  Patient Goals: Sleep, no N/V  Family Goals: NA    Progress made toward(s) clinical / shift goals:  Pt educated on lab values    Patient is not progressing towards the following goals:

## 2023-10-21 NOTE — CARE PLAN
The patient is Stable - Low risk of patient condition declining or worsening    Shift Goals  Clinical Goals: Lab work up results  Patient Goals: Sleep, no N/V      Progress made toward(s) clinical / shift goals:     Problem: Knowledge Deficit - Standard  Goal: Patient and family/care givers will demonstrate understanding of plan of care, disease process/condition, diagnostic tests and medications  Outcome: Progressing     Problem: Gastrointestinal Irritability  Goal: Nausea and vomiting will be absent or improve  Outcome: Progressing

## 2023-10-21 NOTE — PROGRESS NOTES
Hospital Medicine Daily Progress Note    Date of Service  10/21/2023    Chief Complaint  Gene Villanueva is a 22 y.o. male admitted 10/18/2023 with transferred from [facility] for organomegaly with lymphocytosis.    Hospital Course  22-year-old white male who was transferred from [facility] for further evaluation of organomegaly with lymphocytosis.  Reported nausea, vomiting, weight loss. recent ear infection was treated on antibiotics.  He had a swollen lymph node in the right angle of the jaw.  Over the past week or so he noticed little bit more discomfort in the right upper quadrant and also became jaundiced.  He denies any alcohol abuse.  He denies drug IV abuse except using marijuana.  He did admit to unprotected male male sex early October.     The patient reports having infection of osteomyelitis in his right shoulder when he was younger.  He said he was told it was from drinking pond water.  He also lost the top of his second toe secondary to sliding into the lawn more on multiple occasions while he was trying to practice for baseball.     He does not really report any history that he was told that he ever had mononucleosis.    Per chart review, he had lymphocytosis in 2014 (WBC 7, lymphocyte 58%), EBV IgG was positive at that time.  He said he has not had any follow-up since then.    Interval Problem Update  Patient was seen and examined at bedside  Patient feels better    HBV IgM positive, HBV core antibody negative, HBV PCR pending  Elevated liver enzyme and bilirubin -trending down    LUCY positive, anti-Zamudio antibody positive    Significant lymphocytosis, moderate smudge cells  Elevated LDH and D-dimer  CT abdomen showed hepatomegaly, splenomegaly, Mediastinal, right hilar, tonya hepatis, and mild bilateral axillary adenopathy.    I discussed with oncology Dr. Short, ordered flow cytometry, may consider bone marrow if indicated    I have discussed this patient's plan  of care and discharge plan at IDT rounds today with Case Management, Nursing, Nursing leadership, and other members of the IDT team.    Consultants/Specialty  Oncology, GI    Code Status  Full Code    Disposition  The patient is not medically cleared for discharge to home or a post-acute facility.      I have placed the appropriate orders for post-discharge needs.    Review of Systems  All 12 systems were reviewed and negative except as mentioned above      Physical Exam  Temp:  [36.4 °C (97.6 °F)-37.3 °C (99.1 °F)] 36.4 °C (97.6 °F)  Pulse:  [59-94] 70  Resp:  [16] 16  BP: (101-126)/(62-77) 116/64  SpO2:  [96 %-99 %] 98 %    Physical Exam  HENT:      Head: Normocephalic.      Nose: Nose normal.      Mouth/Throat:      Mouth: Mucous membranes are moist.   Eyes:      Extraocular Movements: Extraocular movements intact.      Conjunctiva/sclera: Conjunctivae normal.   Cardiovascular:      Rate and Rhythm: Normal rate and regular rhythm.      Pulses: Normal pulses.      Heart sounds: Normal heart sounds.   Pulmonary:      Effort: Pulmonary effort is normal.      Breath sounds: Normal breath sounds. No wheezing or rales.   Abdominal:      General: Bowel sounds are normal.      Palpations: Abdomen is soft.      Tenderness: There is no abdominal tenderness. There is no guarding.      Comments: Splenomegaly, mild tenderness   Musculoskeletal:         General: No swelling or tenderness. Normal range of motion.      Cervical back: Normal range of motion and neck supple.   Lymphadenopathy:      Cervical: Cervical adenopathy present.      Left cervical: Superficial cervical adenopathy present.   Skin:     General: Skin is warm.   Neurological:      Mental Status: He is alert and oriented to person, place, and time. Mental status is at baseline.   Psychiatric:         Mood and Affect: Mood normal.         Fluids    Intake/Output Summary (Last 24 hours) at 10/21/2023 1343  Last data filed at 10/21/2023 1046  Gross per 24 hour    Intake 1640 ml   Output 300 ml   Net 1340 ml       Laboratory  Recent Labs     10/19/23  0003 10/20/23  0656 10/21/23  0016   WBC 18.3* 18.9* 21.6*   RBC 5.41 5.44 5.81   HEMOGLOBIN 15.2 15.2 16.1   HEMATOCRIT 44.5 44.6 48.3   MCV 82.3 82.0 83.1   MCH 28.1 27.9 27.7   MCHC 34.2 34.1 33.3   RDW 45.8 46.5 48.4   PLATELETCT 174 197 233   MPV 9.2 9.1 9.7     Recent Labs     10/19/23  0003 10/20/23  0656 10/21/23  0016   SODIUM 135 135 135   POTASSIUM 5.4 4.2 4.9   CHLORIDE 101 102 99   CO2 26 23 27   GLUCOSE 102* 104* 99   BUN 8 11 13   CREATININE 0.85 0.84 0.80   CALCIUM 9.0 8.6 9.0     Recent Labs     10/20/23  0007   APTT 38.6*   INR 1.17*               Imaging  CT-CHEST,ABDOMEN,PELVIS WITH   Final Result         1.  Hepatomegaly   2.  Splenomegaly   3.  Mediastinal, right hilar, tonya hepatis, and mild bilateral axillary adenopathy.           Assessment/Plan  * Acute hepatitis  Assessment & Plan  HBV IgM positive  LUCY positive, anti-Zamudio antibody positive  ESR 5  Ferritin 782  HIDA scan and MRCP were negative  CT abdomen showed hepatomegaly, splenomegaly, Mediastinal, right hilar, tonya hepatis, and mild bilateral axillary adenopathy.    Could be combination of hepatitis B/ autoimmune hepatitis/suspected lymphoma        Acute hepatitis B  Assessment & Plan  HBV IgM positive  HBV core antibody negative  Elevated liver enzyme and bilirubin      LUCY positive     HBV virus load pending    I discussed with GI    Lymphocytosis  Assessment & Plan  WBC 22, lymphocytes dominate 86%  Hold off on antibiotics for now  procalcitonin 0.11    Pending work-up for lymphoma/AL L      Splenomegaly- (present on admission)  Assessment & Plan  CT abdomen showed hepatomegaly, splenomegaly, Mediastinal, right hilar, tonya hepatis, and mild bilateral axillary adenopathy.  Significant lymphocytosis    Per chart review, he had lymphocytosis in 2014 (WBC 7, lymphocyte 58%), EBV IgG was positive at that time.  He said he has not had any  follow-up since then.    Pending work-up for lymphoma/ALL  I discussed with oncology      Bilirubinemia  Assessment & Plan  Likely due to hepatitis  No obstruction or bile duct dilation on CT    Monitor         VTE prophylaxis: SCD, lovenox    I have performed a physical exam and reviewed and updated ROS and Plan today (10/21/2023). In review of yesterday's note (10/20/2023), there are no changes except as documented above.

## 2023-10-22 LAB
A2 MACROGLOB SERPL-MCNC: 376 MG/DL (ref 131–293)
ALBUMIN SERPL BCP-MCNC: 3.5 G/DL (ref 3.2–4.9)
ALBUMIN/GLOB SERPL: 1.1 G/DL
ALP SERPL-CCNC: 431 U/L (ref 30–99)
ALT SERPL-CCNC: 280 U/L (ref 2–50)
ALT SERPL-CCNC: 394 U/L (ref 5–50)
ANA PAT SER IF-IMP: ABNORMAL
ANA PAT SER IF-IMP: ABNORMAL
ANION GAP SERPL CALC-SCNC: 9 MMOL/L (ref 7–16)
ANISOCYTOSIS BLD QL SMEAR: ABNORMAL
ANNOTATION COMMENT IMP: ABNORMAL
AST SERPL-CCNC: 164 U/L (ref 12–45)
AST SERPL-CCNC: 206 U/L (ref 9–50)
BASOPHILS # BLD AUTO: 0 % (ref 0–1.8)
BASOPHILS # BLD: 0 K/UL (ref 0–0.12)
BILIRUB SERPL-MCNC: 1.9 MG/DL (ref 0.1–1.5)
BUN SERPL-MCNC: 13 MG/DL (ref 8–22)
BUN SERPL-SCNC: 8 MG/DL (ref 7–20)
CALCIUM ALBUM COR SERPL-MCNC: 8.9 MG/DL (ref 8.5–10.5)
CALCIUM SERPL-MCNC: 8.5 MG/DL (ref 8.5–10.5)
CHLORIDE SERPL-SCNC: 101 MMOL/L (ref 96–112)
CIRRHOMETER PT SCORE Q4850: 0.41
CO2 SERPL-SCNC: 24 MMOL/L (ref 20–33)
COMMENT NL1176: NORMAL
CREAT SERPL-MCNC: 0.76 MG/DL (ref 0.5–1.4)
EOSINOPHIL # BLD AUTO: 0.15 K/UL (ref 0–0.51)
EOSINOPHIL NFR BLD: 0.9 % (ref 0–6.9)
ERYTHROCYTE [DISTWIDTH] IN BLOOD BY AUTOMATED COUNT: 48.4 FL (ref 35.9–50)
FIBROSIS STAGE SERPL QL: ABNORMAL
GFR SERPLBLD CREATININE-BSD FMLA CKD-EPI: 130 ML/MIN/1.73 M 2
GGT SERPL-CCNC: 594 U/L (ref 7–51)
GLOBULIN SER CALC-MCNC: 3.2 G/DL (ref 1.9–3.5)
GLUCOSE SERPL-MCNC: 114 MG/DL (ref 65–99)
HCT VFR BLD AUTO: 42.2 % (ref 42–52)
HGB BLD-MCNC: 14.4 G/DL (ref 14–18)
INFLAMETER META CLASS Q4853: ABNORMAL
INFLAMETER PT SCORE Q4852: 0.99
INR PPP: 1.13 (ref 0.87–1.13)
LIVER FIBR SCORE SERPL CALC.FIBROMETER: 0.99
LYMPHOCYTES # BLD AUTO: 14.04 K/UL (ref 1–4.8)
LYMPHOCYTES NFR BLD: 82.6 % (ref 22–41)
MACROCYTES BLD QL SMEAR: ABNORMAL
MANUAL DIFF BLD: NORMAL
MCH RBC QN AUTO: 28.1 PG (ref 27–33)
MCHC RBC AUTO-ENTMCNC: 34.1 G/DL (ref 32.3–36.5)
MCV RBC AUTO: 82.3 FL (ref 81.4–97.8)
METAMYELOCYTES NFR BLD MANUAL: 0.9 %
MONOCYTES # BLD AUTO: 1.04 K/UL (ref 0–0.85)
MONOCYTES NFR BLD AUTO: 6.1 % (ref 0–13.4)
MORPHOLOGY BLD-IMP: NORMAL
NEUTROPHILS # BLD AUTO: 1.62 K/UL (ref 1.82–7.42)
NEUTROPHILS NFR BLD: 9.5 % (ref 44–72)
NRBC # BLD AUTO: 0 K/UL
NRBC BLD-RTO: 0 /100 WBC (ref 0–0.2)
NUCLEAR IGG SER QL IF: DETECTED
NUCLEAR IGG TITR SER IF: ABNORMAL {TITER}
PATHOLOGY STUDY: ABNORMAL
PLATELET # BLD AUTO: 220 K/UL (ref 164–446)
PLATELET BLD QL SMEAR: NORMAL
PMV BLD AUTO: 8.7 FL (ref 9–12.9)
POTASSIUM SERPL-SCNC: 4.1 MMOL/L (ref 3.6–5.5)
PROT SERPL-MCNC: 6.7 G/DL (ref 6–8.2)
PROTHROM ACT/NOR PPP: 78 % (ref 90–120)
PROTHROMBIN TIME: 14.6 SEC (ref 12–14.6)
RBC # BLD AUTO: 5.13 M/UL (ref 4.7–6.1)
RBC BLD AUTO: PRESENT
SMA IGG SER-ACNC: 52 UNITS (ref 0–19)
SMOOTH MUSCLE IGG TITR SER: ABNORMAL {TITER}
SMUDGE CELLS BLD QL SMEAR: NORMAL
SODIUM SERPL-SCNC: 134 MMOL/L (ref 135–145)
U1 SNRNP IGG SER QL: 9 UNITS (ref 0–19)
WBC # BLD AUTO: 17 K/UL (ref 4.8–10.8)

## 2023-10-22 PROCEDURE — 770004 HCHG ROOM/CARE - ONCOLOGY PRIVATE *

## 2023-10-22 PROCEDURE — 83516 IMMUNOASSAY NONANTIBODY: CPT | Mod: 91

## 2023-10-22 PROCEDURE — 99233 SBSQ HOSP IP/OBS HIGH 50: CPT | Performed by: STUDENT IN AN ORGANIZED HEALTH CARE EDUCATION/TRAINING PROGRAM

## 2023-10-22 PROCEDURE — 80053 COMPREHEN METABOLIC PANEL: CPT

## 2023-10-22 PROCEDURE — 85610 PROTHROMBIN TIME: CPT

## 2023-10-22 PROCEDURE — 36415 COLL VENOUS BLD VENIPUNCTURE: CPT

## 2023-10-22 PROCEDURE — 85027 COMPLETE CBC AUTOMATED: CPT

## 2023-10-22 PROCEDURE — 700111 HCHG RX REV CODE 636 W/ 250 OVERRIDE (IP): Mod: JZ | Performed by: STUDENT IN AN ORGANIZED HEALTH CARE EDUCATION/TRAINING PROGRAM

## 2023-10-22 PROCEDURE — 85007 BL SMEAR W/DIFF WBC COUNT: CPT

## 2023-10-22 RX ADMIN — ENOXAPARIN SODIUM 40 MG: 100 INJECTION SUBCUTANEOUS at 17:01

## 2023-10-22 ASSESSMENT — PAIN DESCRIPTION - PAIN TYPE
TYPE: ACUTE PAIN
TYPE: ACUTE PAIN

## 2023-10-22 NOTE — CARE PLAN
Problem: Knowledge Deficit - Standard  Goal: Patient and family/care givers will demonstrate understanding of plan of care, disease process/condition, diagnostic tests and medications  Outcome: Progressing     The patient is Watcher - Medium risk of patient condition declining or worsening    Shift Goals  Clinical Goals: Lab work up results  Patient Goals: Sleep, no N/V  Family Goals: NA    Progress made toward(s) clinical / shift goals:  Pt educated about lab values    Patient is not progressing towards the following goals:

## 2023-10-22 NOTE — PROGRESS NOTES
[facility] Medicine Daily Progress Note    Date of Service  10/22/2023    Chief Complaint  Gene Villanueva is a 22 y.o. male admitted 10/18/2023 with transferred from [facility] for organomegaly with lymphocytosis.    Hospital Course  22-year-old white male who was transferred from [facility] for further evaluation of organomegaly with lymphocytosis.  Reported nausea, vomiting, weight loss. recent ear infection was treated on antibiotics.  He had a swollen lymph node in the right angle of the jaw.  Over the past week or so he noticed little bit more discomfort in the right upper quadrant and also became jaundiced.  He denies any alcohol abuse.  He denies drug IV abuse except using marijuana.  He did admit to unprotected male male sex early October.     The patient reports having infection of osteomyelitis in his right shoulder when he was younger.  He said he was told it was from drinking pond water.  He also lost the top of his second toe secondary to sliding into the lawn more on multiple occasions while he was trying to practice for baseball.     He does not really report any history that he was told that he ever had mononucleosis.    Per chart review, he had lymphocytosis in 2014 (WBC 7, lymphocyte 58%), EBV IgG was positive at that time.  He said he has not had any follow-up since then.    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    I have discussed this patient's plan of care and discharge plan at IDT rounds today with Case Management, Nursing, Nursing leadership, and other members of the IDT team.    Consultants/Specialty  Oncology, GI    Code Status  Full Code    Disposition  The patient is not medically cleared for discharge to home or a post-acute facility.      I have placed the appropriate orders for post-discharge needs.    Review of Systems  All 12 systems were reviewed and negative except as mentioned above      Physical Exam  Temp:  [36.1 °C (97 °F)-37.1 °C (98.7 °F)] 36.3 °C (97.4 °F)  Pulse:  [66-95] 66  Resp:  [14-16] 16  BP: ()/(62-71) 101/62  SpO2:  [96 %-98 %] 98 %    Physical Exam  HENT:      Head: Normocephalic.      Nose: Nose normal.      Mouth/Throat:      Mouth: Mucous membranes are moist.   Eyes:      Extraocular Movements: Extraocular movements intact.      Conjunctiva/sclera: Conjunctivae normal.   Cardiovascular:      Rate and Rhythm: Normal rate and regular rhythm.      Pulses: Normal pulses.      Heart sounds: Normal heart sounds.   Pulmonary:      Effort: Pulmonary effort is normal.      Breath sounds: Normal breath sounds. No wheezing or rales.   Abdominal:      General: Bowel sounds are normal.      Palpations: Abdomen is soft.      Tenderness: There is no abdominal tenderness. There is no guarding.      Comments: Splenomegaly, mild tenderness   Musculoskeletal:         General: No swelling or tenderness. Normal range of motion.      Cervical back: Normal range of motion and neck supple.   Lymphadenopathy:      Cervical: Cervical adenopathy present.      Left cervical: Superficial cervical adenopathy present.   Skin:     General: Skin is warm.   Neurological:      Mental Status: He is alert and oriented to person,  place, and time. Mental status is at baseline.   Psychiatric:         Mood and Affect: Mood normal.         Fluids    Intake/Output Summary (Last 24 hours) at 10/22/2023 1338  Last data filed at 10/21/2023 1400  Gross per 24 hour   Intake 360 ml   Output --   Net 360 ml       Laboratory  Recent Labs     10/20/23  0656 10/21/23  0016 10/22/23  0000   WBC 18.9* 21.6* 17.0*   RBC 5.44 5.81 5.13   HEMOGLOBIN 15.2 16.1 14.4   HEMATOCRIT 44.6 48.3 42.2   MCV 82.0 83.1 82.3   MCH 27.9 27.7 28.1   MCHC 34.1 33.3 34.1   RDW 46.5 48.4 48.4   PLATELETCT 197 233 220   MPV 9.1 9.7 8.7*     Recent Labs     10/20/23  0656 10/21/23  0016 10/22/23  0000   SODIUM 135 135 134*   POTASSIUM 4.2 4.9 4.1   CHLORIDE 102 99 101   CO2 23 27 24   GLUCOSE 104* 99 114*   BUN 11 13 13   CREATININE 0.84 0.80 0.76   CALCIUM 8.6 9.0 8.5     Recent Labs     10/20/23  0007 10/22/23  0000   APTT 38.6*  --    INR 1.17* 1.13               Imaging  CT-CHEST,ABDOMEN,PELVIS WITH   Final Result         1.  Hepatomegaly   2.  Splenomegaly   3.  Mediastinal, right hilar, tonya hepatis, and mild bilateral axillary adenopathy.           Assessment/Plan  * Acute hepatitis  Assessment & Plan  HBV IgM positive  LUCY positive, anti-Zamudio antibody positive  ESR 5  Ferritin 782  HIDA scan and MRCP were negative  CT abdomen showed hepatomegaly, splenomegaly, Mediastinal, right hilar, tonya hepatis, and mild bilateral axillary adenopathy.    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  -LUCY 1:160, HEp-2, IgG+  -F-Actin Ab, IgG 52  -Smooth Muscle Abs positive 1:80  -Alpha-2 Macroglobulin 376  -Gamma Gt 594  -Factor II 78    Could be combination of hepatitis B/ autoimmune hepatitis/suspected lymphoma        Acute hepatitis B  Assessment & Plan  HBV IgM positive  HBV core antibody negative  Elevated liver enzyme and bilirubin -trending down     HBV virus load pending    I discussed with GI    Lymphocytosis  Assessment & Plan  WBC 22, lymphocytes  dominate 86%  Hold off on antibiotics for now  procalcitonin 0.11    Pending work-up for lymphoma/AL L      Splenomegaly- (present on admission)  Assessment & Plan  CT abdomen showed hepatomegaly, splenomegaly, Mediastinal, right hilar, tonya hepatis, and mild bilateral axillary adenopathy.  Significant lymphocytosis    Per chart review, he had lymphocytosis in 2014 (WBC 7, lymphocyte 58%), EBV IgG was positive at that time.  He said he has not had any follow-up since then.    Pending work-up for lymphoma/ALL  I discussed with oncology      Bilirubinemia  Assessment & Plan  Likely due to hepatitis  No obstruction or bile duct dilation on CT    Monitor         VTE prophylaxis: SCD, lovenox    I have performed a physical exam and reviewed and updated ROS and Plan today (10/22/2023). In review of yesterday's note (10/21/2023), there are no changes except as documented above.    I spent greater than 52 minutes for chart review, obtaining history independently, performing medically appropriate examination,  documenting , ordering medications, tests, or procedures, referring and communicating with other health care professionals, Independently interpreting results and communicating results with patient/family/caregiver. More than 50% of time was spent in face-to-face clinical encounter.      ROS

## 2023-10-22 NOTE — PROGRESS NOTES
Hematology / Oncology Progress Note      Date of visit: 10/22/2023  9:38 AM    History of presenting illness: Gene Villanueva  is a 22 y.o. year old male who is here for follow-up of lymphocytosis/hepatitis B    Subjective-    No new changes overnight.  The patient seems comfortable.  He has no new complaints.    Medications:         Current Facility-Administered Medications   Medication Dose Route Frequency Provider Last Rate Last Admin    ibuprofen (Motrin) tablet 400 mg  400 mg Oral Q6HRS PRN KADEN Lynn   400 mg at 10/21/23 2249    enoxaparin (Lovenox) inj 40 mg  40 mg Subcutaneous DAILY AT 1800 Noris Montalvo M.D.   40 mg at 10/21/23 1645    ondansetron (Zofran) syringe/vial injection 4 mg  4 mg Intravenous Q4HRS PRN Jitendra Del Angel M.D.        ondansetron (Zofran ODT) dispertab 4 mg  4 mg Oral Q4HRS PRN Jitendra Del Angel M.D.        promethazine (Phenergan) tablet 12.5-25 mg  12.5-25 mg Oral Q4HRS PRN Jitendra Del Angel M.D.        promethazine (Phenergan) suppository 12.5-25 mg  12.5-25 mg Rectal Q4HRS PRN Jitendra Del Angel M.D.        prochlorperazine (Compazine) injection 5-10 mg  5-10 mg Intravenous Q4HRS PRN Jitendra Del Angel M.D.               Review of Systems:  All other review of systems are negative except what was mentioned above in the HPI.    Constitutional: Negative for fever, chills, weight loss and malaise/fatigue.    HEENT: No new auditory or visual complaints. No sore throat and neck pain.     Respiratory: Negative for cough, sputum production, shortness of breath and wheezing.    Cardiovascular: Negative for chest pain, palpitations, orthopnea and leg swelling.    Gastrointestinal: Negative for heartburn, nausea, vomiting and abdominal pain.    Genitourinary: Negative for dysuria, hematuria    Musculoskeletal: No new arthralgias or myalgias   Skin: Negative for rash and itching.    Neurological: Negative for focal weakness and headaches.    Endo/Heme/Allergies: No abnormal bleed/bruise.     Psychiatric/Behavioral: No new depression/anxiety.    Physical Exam:   Vitals: /62   Pulse 66   Temp 36.3 °C (97.4 °F) (Temporal)   Resp 16   Wt 71 kg (156 lb 8.4 oz)   SpO2 98%   BMI 24.52 kg/m²     General: Not in acute distress, alert and oriented x 3  HEENT: No icterus.   CNS: Alert and oriented x3, No focal deficits.  Skin- No rash      Labs:   Recent Labs     10/20/23  0007 10/20/23  0656 10/21/23  0016 10/22/23  0000   RBC  --  5.44 5.81 5.13   HEMOGLOBIN  --  15.2 16.1 14.4   HEMATOCRIT  --  44.6 48.3 42.2   PLATELETCT  --  197 233 220   PROTHROMBTM 15.0*  --   --  14.6   APTT 38.6*  --   --   --    INR 1.17*  --   --  1.13     Lab Results   Component Value Date/Time    SODIUM 134 (L) 10/22/2023 12:00 AM    POTASSIUM 4.1 10/22/2023 12:00 AM    CHLORIDE 101 10/22/2023 12:00 AM    CO2 24 10/22/2023 12:00 AM    GLUCOSE 114 (H) 10/22/2023 12:00 AM    BUN 13 10/22/2023 12:00 AM    CREATININE 0.76 10/22/2023 12:00 AM      Hepatitis B core  Mixed hepatocellular/cholestatic:  ?autoimmune  Hepatosplenomegaly with lymphocytosis/reactive     I discussed with the patient that his flow cytometry still pending.  I told him to pathologist have looked at it.  One thought it could be reactive.  It is been sent out to [facility].  It still pending.  I told him based on that we will decide on any other treatment that needs to be done.  In any type of acute infection sometimes you can get a reactive lymphocytosis and clonality has to be proven.    High complexity/extensive discussion this/discussion with primary team  Chris Short M.D.

## 2023-10-22 NOTE — PROGRESS NOTES
Attempted to see patient but patient out of room- reported to be in [facility].     INR 1.13 on 10/22/2023  LUCY titer 1:160, HEp-2, IgG+  F-Actin Ab, IgG 52  Smooth Muscle Abs positive 1:80  Alpha-2 Macroglobulin 376  Gamma Gt 594  Factor II 78  Hepatitis Core ab, total nonreactive.  Hepatitis Cors Ab, IGM reactive.   HBV quant previously ordered twice hours apart; one detected HBV and the other not detected.   Flow Cytometry per oncology remains pending.     Although autoimmune studies elevated, unlikely autoimmune hepatitis. Results may be reactive response given that they are minimally elevated and LFT's continue to improve.   Patient may have hepatitis B that is clearing but difficult to discern with contradicting HBV quant.   Will call and discuss with tertiary hepatologist tomorrow for further assistance.     Discussed with Dr. Bailey, Dr. Henriquez, Dr. Short, Dr. Montalvo, Kun GRIDER

## 2023-10-23 ENCOUNTER — APPOINTMENT (OUTPATIENT)
Dept: RADIOLOGY | Facility: MEDICAL CENTER | Age: 23
DRG: 441 | End: 2023-10-23
Attending: NURSE PRACTITIONER
Payer: COMMERCIAL

## 2023-10-23 LAB
ALBUMIN SERPL BCP-MCNC: 3.7 G/DL (ref 3.2–4.9)
ALBUMIN/GLOB SERPL: 1.1 G/DL
ALP SERPL-CCNC: 382 U/L (ref 30–99)
ALT SERPL-CCNC: 282 U/L (ref 2–50)
ANION GAP SERPL CALC-SCNC: 7 MMOL/L (ref 7–16)
ANISOCYTOSIS BLD QL SMEAR: ABNORMAL
AST SERPL-CCNC: 165 U/L (ref 12–45)
BASOPHILS # BLD AUTO: 0 % (ref 0–1.8)
BASOPHILS # BLD: 0 K/UL (ref 0–0.12)
BILIRUB SERPL-MCNC: 1.7 MG/DL (ref 0.1–1.5)
BUN SERPL-MCNC: 13 MG/DL (ref 8–22)
CALCIUM ALBUM COR SERPL-MCNC: 8.8 MG/DL (ref 8.5–10.5)
CALCIUM SERPL-MCNC: 8.6 MG/DL (ref 8.5–10.5)
CHLORIDE SERPL-SCNC: 102 MMOL/L (ref 96–112)
CO2 SERPL-SCNC: 26 MMOL/L (ref 20–33)
COMMENT NL1176: NORMAL
CREAT SERPL-MCNC: 0.84 MG/DL (ref 0.5–1.4)
DSDNA AB TITR SER CLIF: NORMAL {TITER}
ENA JO1 AB TITR SER: 4 AU/ML (ref 0–40)
ENA SCL70 IGG SER QL: 7 AU/ML (ref 0–40)
ENA SM IGG SER-ACNC: 8 AU/ML (ref 0–40)
ENA SS-A 60KD AB SER-ACNC: 13 AU/ML (ref 0–40)
ENA SS-A IGG SER QL: 9 AU/ML (ref 0–40)
ENA SS-B IGG SER IA-ACNC: 1 AU/ML (ref 0–40)
EOSINOPHIL # BLD AUTO: 0 K/UL (ref 0–0.51)
EOSINOPHIL NFR BLD: 0 % (ref 0–6.9)
ERYTHROCYTE [DISTWIDTH] IN BLOOD BY AUTOMATED COUNT: 49.6 FL (ref 35.9–50)
GFR SERPLBLD CREATININE-BSD FMLA CKD-EPI: 126 ML/MIN/1.73 M 2
GLOBULIN SER CALC-MCNC: 3.3 G/DL (ref 1.9–3.5)
GLUCOSE SERPL-MCNC: 111 MG/DL (ref 65–99)
HCT VFR BLD AUTO: 44 % (ref 42–52)
HGB BLD-MCNC: 14.7 G/DL (ref 14–18)
LYMPHOCYTES # BLD AUTO: 14.06 K/UL (ref 1–4.8)
LYMPHOCYTES NFR BLD: 86.8 % (ref 22–41)
MANUAL DIFF BLD: NORMAL
MCH RBC QN AUTO: 28 PG (ref 27–33)
MCHC RBC AUTO-ENTMCNC: 33.4 G/DL (ref 32.3–36.5)
MCV RBC AUTO: 83.8 FL (ref 81.4–97.8)
MONOCYTES # BLD AUTO: 1.28 K/UL (ref 0–0.85)
MONOCYTES NFR BLD AUTO: 7.9 % (ref 0–13.4)
MORPHOLOGY BLD-IMP: NORMAL
NEUTROPHILS # BLD AUTO: 0.86 K/UL (ref 1.82–7.42)
NEUTROPHILS NFR BLD: 5.3 % (ref 44–72)
NRBC # BLD AUTO: 0 K/UL
NRBC BLD-RTO: 0 /100 WBC (ref 0–0.2)
PATHOLOGY CONSULT NOTE: NORMAL
PLATELET # BLD AUTO: 241 K/UL (ref 164–446)
PLATELET BLD QL SMEAR: NORMAL
PMV BLD AUTO: 9.1 FL (ref 9–12.9)
POTASSIUM SERPL-SCNC: 4.4 MMOL/L (ref 3.6–5.5)
PROT SERPL-MCNC: 7 G/DL (ref 6–8.2)
RBC # BLD AUTO: 5.25 M/UL (ref 4.7–6.1)
RBC BLD AUTO: PRESENT
SMUDGE CELLS BLD QL SMEAR: NORMAL
SODIUM SERPL-SCNC: 135 MMOL/L (ref 135–145)
WBC # BLD AUTO: 16.2 K/UL (ref 4.8–10.8)

## 2023-10-23 PROCEDURE — 77012 CT SCAN FOR NEEDLE BIOPSY: CPT

## 2023-10-23 PROCEDURE — 770004 HCHG ROOM/CARE - ONCOLOGY PRIVATE *

## 2023-10-23 PROCEDURE — 85007 BL SMEAR W/DIFF WBC COUNT: CPT

## 2023-10-23 PROCEDURE — 700111 HCHG RX REV CODE 636 W/ 250 OVERRIDE (IP)

## 2023-10-23 PROCEDURE — 36415 COLL VENOUS BLD VENIPUNCTURE: CPT

## 2023-10-23 PROCEDURE — 83516 IMMUNOASSAY NONANTIBODY: CPT

## 2023-10-23 PROCEDURE — 86480 TB TEST CELL IMMUN MEASURE: CPT

## 2023-10-23 PROCEDURE — 88313 SPECIAL STAINS GROUP 2: CPT | Mod: 91

## 2023-10-23 PROCEDURE — 99232 SBSQ HOSP IP/OBS MODERATE 35: CPT | Performed by: NURSE PRACTITIONER

## 2023-10-23 PROCEDURE — 80053 COMPREHEN METABOLIC PANEL: CPT

## 2023-10-23 PROCEDURE — 88307 TISSUE EXAM BY PATHOLOGIST: CPT

## 2023-10-23 PROCEDURE — 85027 COMPLETE CBC AUTOMATED: CPT

## 2023-10-23 PROCEDURE — 0FB23ZX EXCISION OF LEFT LOBE LIVER, PERCUTANEOUS APPROACH, DIAGNOSTIC: ICD-10-PCS | Performed by: RADIOLOGY

## 2023-10-23 PROCEDURE — 99232 SBSQ HOSP IP/OBS MODERATE 35: CPT | Performed by: STUDENT IN AN ORGANIZED HEALTH CARE EDUCATION/TRAINING PROGRAM

## 2023-10-23 RX ORDER — ONDANSETRON 2 MG/ML
4 INJECTION INTRAMUSCULAR; INTRAVENOUS EVERY 6 HOURS PRN
Status: ACTIVE | OUTPATIENT
Start: 2023-10-23 | End: 2023-10-23

## 2023-10-23 RX ORDER — SODIUM CHLORIDE 9 MG/ML
500 INJECTION, SOLUTION INTRAVENOUS
Status: ACTIVE | OUTPATIENT
Start: 2023-10-23 | End: 2023-10-23

## 2023-10-23 RX ORDER — MIDAZOLAM HYDROCHLORIDE 1 MG/ML
.5-2 INJECTION INTRAMUSCULAR; INTRAVENOUS PRN
Status: ACTIVE | OUTPATIENT
Start: 2023-10-23 | End: 2023-10-23

## 2023-10-23 RX ORDER — MIDAZOLAM HYDROCHLORIDE 1 MG/ML
INJECTION INTRAMUSCULAR; INTRAVENOUS
Status: COMPLETED
Start: 2023-10-23 | End: 2023-10-23

## 2023-10-23 RX ADMIN — FENTANYL CITRATE 50 MCG: 50 INJECTION, SOLUTION INTRAMUSCULAR; INTRAVENOUS at 11:41

## 2023-10-23 RX ADMIN — MIDAZOLAM 1 MG: 1 INJECTION, SOLUTION INTRAMUSCULAR; INTRAVENOUS at 11:44

## 2023-10-23 RX ADMIN — MIDAZOLAM HYDROCHLORIDE 1 MG: 1 INJECTION INTRAMUSCULAR; INTRAVENOUS at 11:44

## 2023-10-23 ASSESSMENT — ENCOUNTER SYMPTOMS
FALLS: 0
CONSTIPATION: 0
SHORTNESS OF BREATH: 0
NERVOUS/ANXIOUS: 0
INSOMNIA: 0
MYALGIAS: 0
FLANK PAIN: 0
BLOOD IN STOOL: 0
NAUSEA: 0
WEAKNESS: 0
VOMITING: 0
DIARRHEA: 0
FEVER: 0
ABDOMINAL PAIN: 1
HEADACHES: 0
SORE THROAT: 0
COUGH: 0

## 2023-10-23 ASSESSMENT — PAIN DESCRIPTION - PAIN TYPE
TYPE: ACUTE PAIN
TYPE: SURGICAL PAIN
TYPE: SURGICAL PAIN

## 2023-10-23 NOTE — PROGRESS NOTES
Pt presents to CT 4. Pt was consented by MD at bedside, confirmed by this RN and consent at bedside. Pt remained in bed in semi-lane's position. Patient underwent an ultrasound-guided liver biopsy by Dr. Thompson. Procedure site was marked by MD and verified using imaging guidance. Pt placed on monitor, prepped and draped in a sterile fashion. Vitals were taken every 5 minutes and remained stable during procedure (see doc flow sheet for results). CO2 waveform capnography was monitored and remained WNL throughout procedure. Report called to MARNI Bains. Pt transported by stretcher with RN to R306.     Specimen: 3 cores in formalin hand delivered to lab.

## 2023-10-23 NOTE — CARE PLAN
The patient is Watcher - Medium risk of patient condition declining or worsening    Shift Goals  Clinical Goals: Discuss plan of care, liver biopsy  Patient Goals: Rest  Family Goals: NA    Progress made toward(s) clinical / shift goals:    Problem: Knowledge Deficit - Standard  Goal: Patient and family/care givers will demonstrate understanding of plan of care, disease process/condition, diagnostic tests and medications  Outcome: Progressing  Note: Pt updated on plan of care, pt states understanding for plan of monitoring post IR Liver biopsy, monitoring labs, and pending results. All questions answered at this time.      Problem: Pain - Standard  Goal: Alleviation of pain or a reduction in pain to the patient’s comfort goal  Outcome: Progressing  Note: Pain assessed using 0-10 verbal pain scale, pt denies need for pain medications.        Patient is not progressing towards the following goals: N/A

## 2023-10-23 NOTE — PROGRESS NOTES
Hematology / Oncology Progress Note      Date of visit: 10/22/2023  9:38 AM    History of presenting illness: Gene Villanueva  is a 22 y.o. year old male who is here for follow-up of lymphocytosis/hepatitis B    Subjective-  Asymptomatic , flow negative for clonal abnormalities     Medications:         Current Facility-Administered Medications   Medication Dose Route Frequency Provider Last Rate Last Admin    ibuprofen (Motrin) tablet 400 mg  400 mg Oral Q6HRS PRN KADEN Lynn   400 mg at 10/21/23 2249    enoxaparin (Lovenox) inj 40 mg  40 mg Subcutaneous DAILY AT 1800 Noris Montalvo M.D.   40 mg at 10/22/23 1701    ondansetron (Zofran) syringe/vial injection 4 mg  4 mg Intravenous Q4HRS PRN Jitendra Del Angel M.D.        ondansetron (Zofran ODT) dispertab 4 mg  4 mg Oral Q4HRS PRN Jitendra Del Angel M.D.        promethazine (Phenergan) tablet 12.5-25 mg  12.5-25 mg Oral Q4HRS PRN Jitendra Del Angel M.D.        promethazine (Phenergan) suppository 12.5-25 mg  12.5-25 mg Rectal Q4HRS PRN Jitendra Del Angel M.D.        prochlorperazine (Compazine) injection 5-10 mg  5-10 mg Intravenous Q4HRS PRN Jitendra Del Angel M.D.               Review of Systems:  All other review of systems are negative except what was mentioned above in the HPI.    Constitutional: Negative for fever, chills, weight loss and malaise/fatigue.    HEENT: No new auditory or visual complaints. No sore throat and neck pain.     Respiratory: Negative for cough, sputum production, shortness of breath and wheezing.    Cardiovascular: Negative for chest pain, palpitations, orthopnea and leg swelling.    Gastrointestinal: Negative for heartburn, nausea, vomiting and abdominal pain.    Genitourinary: Negative for dysuria, hematuria    Musculoskeletal: No new arthralgias or myalgias   Skin: Negative for rash and itching.    Neurological: Negative for focal weakness and headaches.    Endo/Heme/Allergies: No abnormal bleed/bruise.    Psychiatric/Behavioral: No new  depression/anxiety.    Physical Exam:   Vitals: /64   Pulse 75   Temp 36.8 °C (98.2 °F) (Temporal)   Resp 18   Wt 71 kg (156 lb 8.4 oz)   SpO2 97%   BMI 24.52 kg/m²     General: Not in acute distress, alert and oriented x 3  HEENT: No icterus.   CNS: Alert and oriented x3, No focal deficits.  Skin- No rash      Labs:   Recent Labs     10/21/23  0016 10/22/23  0000 10/23/23  0003   RBC 5.81 5.13 5.25   HEMOGLOBIN 16.1 14.4 14.7   HEMATOCRIT 48.3 42.2 44.0   PLATELETCT 233 220 241   PROTHROMBTM  --  14.6  --    INR  --  1.13  --        Lab Results   Component Value Date/Time    SODIUM 135 10/23/2023 12:03 AM    POTASSIUM 4.4 10/23/2023 12:03 AM    CHLORIDE 102 10/23/2023 12:03 AM    CO2 26 10/23/2023 12:03 AM    GLUCOSE 111 (H) 10/23/2023 12:03 AM    BUN 13 10/23/2023 12:03 AM    CREATININE 0.84 10/23/2023 12:03 AM      Hepatitis B core  Mixed hepatocellular/cholestatic:  ?autoimmune  Hepatosplenomegaly with lymphocytosis/reactive     Flow shows no clonal abnormalities , most probably reactive to Hep B given the decreased CD4:CD8 ratio .  No indication for BM bx at this point. Can be monitored as outpt , if significant increase in absolute lymphocytosis in future pls refer him to Dr Short   Will sign off     High complexity/extensive discussion this/discussion with primary team  Magen Jackson M.D.

## 2023-10-23 NOTE — PROGRESS NOTES
Gastroenterology Progress Note               Author:  KADEN Schwartz Date & Time Created: 10/23/2023 12:24 PM       Patient ID:  Name:             Gene Villanueva    YOB: 2000  Age:                 22 y.o.  male  MRN:               [id number]        Medical Decision Making, by Problem:  Active Hospital Problems    Diagnosis     Acute hepatitis B [B16.9]     Bilirubinemia [E80.6]     Splenomegaly [R16.1]     Acute hepatitis [B17.9]     Lymphocytosis [D72.820]            Presenting Chief Complaint:  HBV IgM +        History of Present Illness:    This is a  22 y.o. male presented to [facility] on October 14 with nausea and vomiting, intermittent fevers, LUQ worse with deep inspiration and his mother noted him to be jaundiced the day prior.  On presentation, his , , alkaline phosphatase 437 and bilirubin 6.3.  He was initially thought to have alcohol induced liver disease but CBC showed elevated WBC and atypical lymphs.  LDH was 650.  MRCP and HIDA results not available but US abd showed spleen 16.5cm otherwise normal.     History notable for recent trip to [place] (Sept 9-Sept 16 and was in the woods for 2 weeks and did have mosquito bites. He 2 days of feeling unwell before he left but definitely had flulike symptoms when he returned.  He was seen in urgent care and given amoxicillin for left ear infection.  Dr. Short was able to elicit a history of male male sexual encounter earlier this month and patient stated it was unprotected oral sex.       Current labs:  WBC 18.3, plt 174, 86.4% lymph, moderate smudge cells  , , , bilirubin 4.2  HAV IgM neg, HBsAg neg, HBcore IgM positive, HCV Ab neg  HIV NR  CT chest/abd/pel with:  mediastinal and right hilar lymph nodes, bilat axillary nodes, hepatomegaly, splenomegaly, tonya hepatis nodes up to 1cm         Interval History:  10/20/2023: Patient seen at bedside.  Reports some abdominal tightness and  nausea if he eats too fast. He states he is tolerating eating more in his meals. He also states he's been told his eyes are looking less yellow. LFt's down trending. Hep B quant pending.     10/23/2023: Patient seen at bedside.  Reports feeling well without nausea, vomiting.  Reports some right upper quadrant tenderness with palpation.  Full cytometry negative for lymphoma.  LFTs continue to downtrend.    Hospital Medications:  Current Facility-Administered Medications   Medication Dose Frequency Provider Last Rate Last Admin    NS (Bolus) infusion 500 mL  500 mL Once PRN Yosvany Thompson M.D.        fentaNYL (Sublimaze) injection 12.5-50 mcg  12.5-50 mcg PRN Yosvany Thompson M.D.   50 mcg at 10/23/23 1141    midazolam (Versed) injection 0.5-2 mg  0.5-2 mg PRN Yosvany Thompson M.D.   1 mg at 10/23/23 1144    ondansetron (Zofran) syringe/vial injection 4 mg  4 mg Q6HRS PRN Yosvany Thompson M.D.        ibuprofen (Motrin) tablet 400 mg  400 mg Q6HRS PRN MEMO LynnRHAILEY   400 mg at 10/21/23 2249    enoxaparin (Lovenox) inj 40 mg  40 mg DAILY AT 1800 Noris Montalvo M.D.   40 mg at 10/22/23 1701    ondansetron (Zofran) syringe/vial injection 4 mg  4 mg Q4HRS PRN Jitendra Del Angel M.D.        ondansetron (Zofran ODT) dispertab 4 mg  4 mg Q4HRS PRN Jitendra Del Angel M.D.        promethazine (Phenergan) tablet 12.5-25 mg  12.5-25 mg Q4HRS PRОЛЬГА Del Angel M.D.        promethazine (Phenergan) suppository 12.5-25 mg  12.5-25 mg Q4HRS PRN Jitendra Del Angel M.D.        prochlorperazine (Compazine) injection 5-10 mg  5-10 mg Q4HRS PRN Jitendra Del Angel M.D.       Last reviewed on 10/18/2023  9:18 PM by Tip Garcia R.N.       Review of Systems:  Review of Systems   Constitutional:  Negative for fever and malaise/fatigue.   HENT:  Negative for congestion and sore throat.    Respiratory:  Negative for cough and shortness of breath.    Cardiovascular:  Negative for chest pain and leg swelling.   Gastrointestinal:  Positive for  abdominal pain (Mild right upper quadrant discomfort with palpation). Negative for blood in stool, constipation, diarrhea, melena, nausea and vomiting.   Genitourinary:  Negative for dysuria and flank pain.   Musculoskeletal:  Negative for falls and myalgias.   Neurological:  Negative for weakness and headaches.   Psychiatric/Behavioral:  The patient is not nervous/anxious and does not have insomnia.    All other systems reviewed and are negative.        Vital signs:  Weight/BMI: Body mass index is 24.52 kg/m².  /67   Pulse 73   Temp 36.8 °C (98.2 °F) (Temporal)   Resp 13   Wt 71 kg (156 lb 8.4 oz)   SpO2 95%   Vitals:    10/23/23 1145 10/23/23 1150 10/23/23 1200 10/23/23 1215   BP: 102/50 106/59 120/79 114/67   Pulse: 78 71 81 73   Resp: 14 12 14 13   Temp:   36.7 °C (98.1 °F) 36.8 °C (98.2 °F)   TempSrc:   Temporal Temporal   SpO2: 98% 97% 98% 95%   Weight:         Oxygen Therapy:  Pulse Oximetry: 95 %, O2 (LPM): 0, O2 Delivery Device: None - Room Air  No intake or output data in the 24 hours ending 10/23/23 1224        Physical Exam:  Physical Exam  Vitals and nursing note reviewed.   Constitutional:       General: He is awake. He is not in acute distress.     Appearance: He is well-developed, well-groomed and normal weight.   HENT:      Head: Normocephalic.      Nose: Nose normal. No congestion.      Mouth/Throat:      Mouth: Mucous membranes are moist.      Pharynx: Oropharynx is clear. No oropharyngeal exudate.   Eyes:      General: Scleral icterus present.      Extraocular Movements: Extraocular movements intact.      Conjunctiva/sclera: Conjunctivae normal.   Neck:      Comments: Palpable cervical adenopathy  Cardiovascular:      Rate and Rhythm: Normal rate and regular rhythm.      Pulses: Normal pulses.      Heart sounds: Normal heart sounds. No murmur heard.  Pulmonary:      Effort: Pulmonary effort is normal. No respiratory distress.      Breath sounds: Normal breath sounds.   Abdominal:       General: Abdomen is flat. Bowel sounds are normal. There is no distension.      Palpations: Abdomen is soft.      Tenderness: There is no abdominal tenderness. There is no guarding.   Musculoskeletal:      Right lower leg: No edema.      Left lower leg: No edema.   Skin:     General: Skin is warm and dry.      Capillary Refill: Capillary refill takes less than 2 seconds.      Coloration: Skin is not jaundiced.   Neurological:      General: No focal deficit present.      Mental Status: He is alert and oriented to person, place, and time. Mental status is at baseline.   Psychiatric:         Mood and Affect: Mood normal.         Behavior: Behavior normal. Behavior is cooperative.         Thought Content: Thought content normal.         Judgment: Judgment normal.             Labs:  Recent Labs     10/21/23  0016 10/22/23  0000 10/23/23  0003   SODIUM 135 134* 135   POTASSIUM 4.9 4.1 4.4   CHLORIDE 99 101 102   CO2 27 24 26   BUN 13 13 13   CREATININE 0.80 0.76 0.84   CALCIUM 9.0 8.5 8.6       Recent Labs     10/21/23  0016 10/22/23  0000 10/23/23  0003   ALTSGPT 321* 280* 282*   ASTSGOT 182* 164* 165*   ALKPHOSPHAT 575* 431* 382*   TBILIRUBIN 2.6* 1.9* 1.7*   GLUCOSE 99 114* 111*       Recent Labs     10/21/23  0016 10/22/23  0000 10/23/23  0003   WBC 21.6* 17.0* 16.2*   NEUTSPOLYS 7.90* 9.50* 5.30*   LYMPHOCYTES 85.10* 82.60* 86.80*   MONOCYTES 6.10 6.10 7.90   EOSINOPHILS 0.90 0.90 0.00   BASOPHILS 0.00 0.00 0.00   ASTSGOT 182* 164* 165*   ALTSGPT 321* 280* 282*   ALKPHOSPHAT 575* 431* 382*   TBILIRUBIN 2.6* 1.9* 1.7*       Recent Labs     10/21/23  0016 10/22/23  0000 10/23/23  0003   RBC 5.81 5.13 5.25   HEMOGLOBIN 16.1 14.4 14.7   HEMATOCRIT 48.3 42.2 44.0   PLATELETCT 233 220 241   PROTHROMBTM  --  14.6  --    INR  --  1.13  --        Recent Results (from the past 24 hour(s))   CBC WITH DIFFERENTIAL    Collection Time: 10/23/23 12:03 AM   Result Value Ref Range    WBC 16.2 (H) 4.8 - 10.8 K/uL    RBC 5.25 4.70 -  6.10 M/uL    Hemoglobin 14.7 14.0 - 18.0 g/dL    Hematocrit 44.0 42.0 - 52.0 %    MCV 83.8 81.4 - 97.8 fL    MCH 28.0 27.0 - 33.0 pg    MCHC 33.4 32.3 - 36.5 g/dL    RDW 49.6 35.9 - 50.0 fL    Platelet Count 241 164 - 446 K/uL    MPV 9.1 9.0 - 12.9 fL    Neutrophils-Polys 5.30 (L) 44.00 - 72.00 %    Lymphocytes 86.80 (H) 22.00 - 41.00 %    Monocytes 7.90 0.00 - 13.40 %    Eosinophils 0.00 0.00 - 6.90 %    Basophils 0.00 0.00 - 1.80 %    Nucleated RBC 0.00 0.00 - 0.20 /100 WBC    Neutrophils (Absolute) 0.86 (L) 1.82 - 7.42 K/uL    Lymphs (Absolute) 14.06 (H) 1.00 - 4.80 K/uL    Monos (Absolute) 1.28 (H) 0.00 - 0.85 K/uL    Eos (Absolute) 0.00 0.00 - 0.51 K/uL    Baso (Absolute) 0.00 0.00 - 0.12 K/uL    NRBC (Absolute) 0.00 K/uL    Anisocytosis 1+    Comp Metabolic Panel    Collection Time: 10/23/23 12:03 AM   Result Value Ref Range    Sodium 135 135 - 145 mmol/L    Potassium 4.4 3.6 - 5.5 mmol/L    Chloride 102 96 - 112 mmol/L    Co2 26 20 - 33 mmol/L    Anion Gap 7.0 7.0 - 16.0    Glucose 111 (H) 65 - 99 mg/dL    Bun 13 8 - 22 mg/dL    Creatinine 0.84 0.50 - 1.40 mg/dL    Calcium 8.6 8.5 - 10.5 mg/dL    Correct Calcium 8.8 8.5 - 10.5 mg/dL    AST(SGOT) 165 (H) 12 - 45 U/L    ALT(SGPT) 282 (H) 2 - 50 U/L    Alkaline Phosphatase 382 (H) 30 - 99 U/L    Total Bilirubin 1.7 (H) 0.1 - 1.5 mg/dL    Albumin 3.7 3.2 - 4.9 g/dL    Total Protein 7.0 6.0 - 8.2 g/dL    Globulin 3.3 1.9 - 3.5 g/dL    A-G Ratio 1.1 g/dL   ESTIMATED GFR    Collection Time: 10/23/23 12:03 AM   Result Value Ref Range    GFR (CKD-EPI) 126 >60 mL/min/1.73 m 2   MORPHOLOGY    Collection Time: 10/23/23 12:03 AM   Result Value Ref Range    RBC Morphology Present     Smudge Cells Moderate    PERIPHERAL SMEAR REVIEW    Collection Time: 10/23/23 12:03 AM   Result Value Ref Range    Peripheral Smear Review see below    DIFFERENTIAL MANUAL    Collection Time: 10/23/23 12:03 AM   Result Value Ref Range    Manual Diff Status PERFORMED     Comment See Comment     PLATELET ESTIMATE    Collection Time: 10/23/23 12:03 AM   Result Value Ref Range    Plt Estimation Normal    Histology Request    Collection Time: 10/23/23 11:51 AM   Result Value Ref Range    Pathology Request Sent to Histo        Radiology Review:  CT-NEEDLE BX-LIVER   Final Result      Ultrasound-guided hepatic parenchymal biopsy.      CT-CHEST,ABDOMEN,PELVIS WITH   Final Result         1.  Hepatomegaly   2.  Splenomegaly   3.  Mediastinal, right hilar, tonya hepatis, and mild bilateral axillary adenopathy.            MDM (Data Review):   -Records reviewed and summarized in current documentation  -I personally reviewed and interpreted the laboratory results  -I personally reviewed the radiology images    Assessment/Recommendations:  Impression:   Acute hepatitis B:  hepatitis B core IgM with negative hepatitis B surface antigen--window period   Mixed hepatocellular/cholestatic injury.  Transaminases improved, bilirubin improved but alk phos up some  Hepatosplenomegaly with adenopathy  Lymphocytosis with moderate smudge cells     MDM:  This is a 22-year-old male with a past medical history as listed above who presented to [facility] on 10/18/2023 with acute hepatitis.  He also had notably large lymph nodes and leukocytosis concerning for lymphoma.  Evaluated by oncology team, flow cytometry negative for abnormalities/lymphoma.  During admission, LFTs continue to downtrend.  However, he is noted to have what appears to be recent exposure to hepatitis B.  Given that his LFTs are downtrending, suspect that he is clearing virus itself.  Additionally, he has had mildly elevated autoimmune markers.  These are nonspecific for autoimmune hepatitis but could be inclusive of it.  He has previously had elevated LFTs/evaluation/work-up including Parvez-Barr virus in 2014 but was not checked for autoimmune hepatitis at that time.  Given the severity of patient's transaminitis on arrival as well as mildly  elevated autoimmune markers, will proceed with liver biopsy for further evaluation of autoimmune hepatitis.  Additionally, will order SLA which has high specificity for autoimmune hepatitis.  Patient does not need to remain inpatient for these results.    Plan  Care is supportive in acute hep B and most go on to clear the infection.  Likelihood of fulminant disease is very low so no nucleoside treatment at this time.  Liver biopsy today-GI will follow pathology  Check SLA-patient does not need to remain inpatient awaiting the results.  Patient will need outpatient GI follow-up for ongoing management/care-referral placed      Discussed with patient, Dr. Montalvo, nursing, Dr. Meza.      Core Quality Measures   Reviewed items::  Labs, Medications and Radiology reports reviewed

## 2023-10-23 NOTE — PROGRESS NOTES
[facility] Medicine Daily Progress Note    Date of Service  10/23/2023    Chief Complaint  Gene Villanueva is a 22 y.o. male admitted 10/18/2023 with transferred from [facility] for organomegaly with lymphocytosis.    Hospital Course  22-year-old white male who was transferred from [facility] for further evaluation of organomegaly with lymphocytosis.  Reported nausea, vomiting, weight loss. recent ear infection was treated on antibiotics.  He had a swollen lymph node in the right angle of the jaw.  Over the past week or so he noticed little bit more discomfort in the right upper quadrant and also became jaundiced.  He denies any alcohol abuse.  He denies drug IV abuse except using marijuana.  He did admit to unprotected male male sex early October.     The patient reports having infection of osteomyelitis in his right shoulder when he was younger.  He said he was told it was from drinking pond water.  He also lost the top of his second toe secondary to sliding into the lawn more on multiple occasions while he was trying to practice for baseball.     He does not really report any history that he was told that he ever had mononucleosis.    Per chart review, he had lymphocytosis in 2014 (WBC 7, lymphocyte 58%), EBV IgG was positive at that time.  He said he has not had any follow-up since then.    Interval Problem Update  Patient was seen and examined at bedside  Patient feels better    Acute hepatitis B  Suspected autoimmune hepatitis -s/p IR biopsy today, GI will follow up with the result.  Needs outpatient GI referral    Per oncology, Flow shows no clonal abnormalities , most probably reactive to Hep B given the decreased CD4:CD8 ratio .  Unlikely lymphoma.  No bone marrow biopsy indicated at this point.  Continue to monitor lymphocytosis, need further work-up if there is significant increase    HBV PCR pending  Elevated liver enzyme and bilirubin -trending down    I have discussed  this patient's plan of care and discharge plan at IDT rounds today with Case Management, Nursing, Nursing leadership, and other members of the IDT team.    Consultants/Specialty  Oncology, GI    Code Status  Full Code    Disposition  The patient is not medically cleared for discharge to home or a post-acute facility.      I have placed the appropriate orders for post-discharge needs.    Review of Systems  All 12 systems were reviewed and negative except as mentioned above      Physical Exam  Temp:  [36.4 °C (97.6 °F)-36.8 °C (98.3 °F)] 36.8 °C (98.2 °F)  Pulse:  [68-98] 76  Resp:  [12-19] 16  BP: (102-135)/(50-81) 118/63  SpO2:  [95 %-99 %] 96 %    Physical Exam  HENT:      Head: Normocephalic.      Nose: Nose normal.      Mouth/Throat:      Mouth: Mucous membranes are moist.   Eyes:      Extraocular Movements: Extraocular movements intact.      Conjunctiva/sclera: Conjunctivae normal.   Cardiovascular:      Rate and Rhythm: Normal rate and regular rhythm.      Pulses: Normal pulses.      Heart sounds: Normal heart sounds.   Pulmonary:      Effort: Pulmonary effort is normal.      Breath sounds: Normal breath sounds. No wheezing or rales.   Abdominal:      General: Bowel sounds are normal.      Palpations: Abdomen is soft.      Tenderness: There is no abdominal tenderness. There is no guarding.      Comments: Splenomegaly, mild tenderness   Musculoskeletal:         General: No swelling or tenderness. Normal range of motion.      Cervical back: Normal range of motion and neck supple.   Lymphadenopathy:      Cervical: Cervical adenopathy present.      Left cervical: Superficial cervical adenopathy present.   Skin:     General: Skin is warm.   Neurological:      Mental Status: He is alert and oriented to person, place, and time. Mental status is at baseline.   Psychiatric:         Mood and Affect: Mood normal.         Fluids  No intake or output data in the 24 hours ending 10/23/23 1532      Laboratory  Recent Labs      10/21/23  0016 10/22/23  0000 10/23/23  0003   WBC 21.6* 17.0* 16.2*   RBC 5.81 5.13 5.25   HEMOGLOBIN 16.1 14.4 14.7   HEMATOCRIT 48.3 42.2 44.0   MCV 83.1 82.3 83.8   MCH 27.7 28.1 28.0   MCHC 33.3 34.1 33.4   RDW 48.4 48.4 49.6   PLATELETCT 233 220 241   MPV 9.7 8.7* 9.1     Recent Labs     10/21/23  0016 10/22/23  0000 10/23/23  0003   SODIUM 135 134* 135   POTASSIUM 4.9 4.1 4.4   CHLORIDE 99 101 102   CO2 27 24 26   GLUCOSE 99 114* 111*   BUN 13 13 13   CREATININE 0.80 0.76 0.84   CALCIUM 9.0 8.5 8.6     Recent Labs     10/22/23  0000   INR 1.13               Imaging  CT-NEEDLE BX-LIVER   Final Result      Ultrasound-guided hepatic parenchymal biopsy.      CT-CHEST,ABDOMEN,PELVIS WITH   Final Result         1.  Hepatomegaly   2.  Splenomegaly   3.  Mediastinal, right hilar, tonya hepatis, and mild bilateral axillary adenopathy.           Assessment/Plan  * Acute hepatitis  Assessment & Plan  HBV IgM positive  LUCY positive, anti-Zamudio antibody positive  ESR 5  Ferritin 782  HIDA scan and MRCP were negative  CT abdomen showed hepatomegaly, splenomegaly, Mediastinal, right hilar, tonya hepatis, and mild bilateral axillary adenopathy.    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  -LUCY 1:160, HEp-2, IgG+  -F-Actin Ab, IgG 52  -Smooth Muscle Abs positive 1:80  -Alpha-2 Macroglobulin 376  -Gamma Gt 594  -Factor II 78    s/p IR biopsy today, GI will follow up with the result.  Needs outpatient GI referral        Acute hepatitis B  Assessment & Plan  HBV IgM positive  HBV core antibody negative  Elevated liver enzyme and bilirubin -trending down     HBV virus load pending    I discussed with GI    Lymphocytosis  Assessment & Plan  WBC 22, lymphocytes dominate 86%  Hold off on antibiotics for now  procalcitonin 0.11    10/23 Per oncology, Flow shows no clonal abnormalities , most probably reactive to Hep B given the decreased CD4:CD8 ratio .  Unlikely lymphoma.  No bone marrow biopsy indicated  at this point.  Continue to monitor lymphocytosis, need further work-up if there is significant increase      Splenomegaly- (present on admission)  Assessment & Plan  CT abdomen showed hepatomegaly, splenomegaly, Mediastinal, right hilar, tonya hepatis, and mild bilateral axillary adenopathy.  Significant lymphocytosis    Per chart review, he had lymphocytosis in 2014 (WBC 7, lymphocyte 58%), EBV IgG was positive at that time.  He said he has not had any follow-up since then.    10/23 Per oncology, Flow shows no clonal abnormalities , most probably reactive to Hep B given the decreased CD4:CD8 ratio .  Unlikely lymphoma.  No bone marrow biopsy indicated at this point.  Continue to monitor lymphocytosis, need further work-up if there is significant increase      Bilirubinemia  Assessment & Plan  Likely due to hepatitis  No obstruction or bile duct dilation on CT    Monitor         VTE prophylaxis: SCD, lovenox    I have performed a physical exam and reviewed and updated ROS and Plan today (10/23/2023). In review of yesterday's note (10/22/2023), there are no changes except as documented above.        ROS

## 2023-10-23 NOTE — DIETARY
Nutrition Update:    Day 5 of admit.  Gene Villanueva is a 22 y.o. male being followed to optimize nutrition.    Current Diet: regular, Boost supplements once daily    Problem: Nutritional:  Goal: Achieve adequate nutritional intake  Description: Patient will consume 50% of meals  Outcome: Progressing.    Patient is eating % of most recent meals.    Nutrition Representative will continue to see him for ongoing meal and snack preferences.  RD following.

## 2023-10-23 NOTE — CARE PLAN
The patient is Stable - Low risk of patient condition declining or worsening    Shift Goals  Clinical Goals: Await lab results  Patient Goals: Sleep      Progress made toward(s) clinical / shift goals:     Problem: Knowledge Deficit - Standard  Goal: Patient and family/care givers will demonstrate understanding of plan of care, disease process/condition, diagnostic tests and medications  Outcome: Progressing     Problem: Gastrointestinal Irritability  Goal: Nausea and vomiting will be absent or improve  Outcome: Progressing

## 2023-10-24 VITALS
DIASTOLIC BLOOD PRESSURE: 60 MMHG | HEART RATE: 69 BPM | RESPIRATION RATE: 16 BRPM | BODY MASS INDEX: 24.52 KG/M2 | SYSTOLIC BLOOD PRESSURE: 106 MMHG | OXYGEN SATURATION: 98 % | TEMPERATURE: 98 F | WEIGHT: 156.53 LBS

## 2023-10-24 LAB
ALBUMIN SERPL BCP-MCNC: 3.9 G/DL (ref 3.2–4.9)
ALBUMIN/GLOB SERPL: 1.1 G/DL
ALP SERPL-CCNC: 344 U/L (ref 30–99)
ALT SERPL-CCNC: 293 U/L (ref 2–50)
ANION GAP SERPL CALC-SCNC: 10 MMOL/L (ref 7–16)
AST SERPL-CCNC: 163 U/L (ref 12–45)
BASOPHILS # BLD AUTO: 0 % (ref 0–1.8)
BASOPHILS # BLD: 0 K/UL (ref 0–0.12)
BILIRUB SERPL-MCNC: 1.7 MG/DL (ref 0.1–1.5)
BUN SERPL-MCNC: 15 MG/DL (ref 8–22)
CALCIUM ALBUM COR SERPL-MCNC: 9 MG/DL (ref 8.5–10.5)
CALCIUM SERPL-MCNC: 8.9 MG/DL (ref 8.5–10.5)
CHLORIDE SERPL-SCNC: 102 MMOL/L (ref 96–112)
CO2 SERPL-SCNC: 26 MMOL/L (ref 20–33)
CREAT SERPL-MCNC: 0.88 MG/DL (ref 0.5–1.4)
EOSINOPHIL # BLD AUTO: 0 K/UL (ref 0–0.51)
EOSINOPHIL NFR BLD: 0 % (ref 0–6.9)
ERYTHROCYTE [DISTWIDTH] IN BLOOD BY AUTOMATED COUNT: 48.6 FL (ref 35.9–50)
GAMMA INTERFERON BACKGROUND BLD IA-ACNC: 0.13 IU/ML
GFR SERPLBLD CREATININE-BSD FMLA CKD-EPI: 124 ML/MIN/1.73 M 2
GLOBULIN SER CALC-MCNC: 3.5 G/DL (ref 1.9–3.5)
GLUCOSE SERPL-MCNC: 99 MG/DL (ref 65–99)
HCT VFR BLD AUTO: 44.6 % (ref 42–52)
HGB BLD-MCNC: 15.1 G/DL (ref 14–18)
LYMPHOCYTES # BLD AUTO: 12.15 K/UL (ref 1–4.8)
LYMPHOCYTES NFR BLD: 86.2 % (ref 22–41)
M TB IFN-G BLD-IMP: NEGATIVE
M TB IFN-G CD4+ BCKGRND COR BLD-ACNC: -0.01 IU/ML
MANUAL DIFF BLD: NORMAL
MCH RBC QN AUTO: 28.2 PG (ref 27–33)
MCHC RBC AUTO-ENTMCNC: 33.9 G/DL (ref 32.3–36.5)
MCV RBC AUTO: 83.4 FL (ref 81.4–97.8)
MICROCYTES BLD QL SMEAR: ABNORMAL
MITOGEN IGNF BCKGRD COR BLD-ACNC: >10 IU/ML
MONOCYTES # BLD AUTO: 0.52 K/UL (ref 0–0.85)
MONOCYTES NFR BLD AUTO: 3.7 % (ref 0–13.4)
MORPHOLOGY BLD-IMP: NORMAL
MYELOPEROXIDASE AB SER-ACNC: 3 AU/ML (ref 0–19)
NEUTROPHILS # BLD AUTO: 1.42 K/UL (ref 1.82–7.42)
NEUTROPHILS NFR BLD: 10.1 % (ref 44–72)
NRBC # BLD AUTO: 0 K/UL
NRBC BLD-RTO: 0 /100 WBC (ref 0–0.2)
PLATELET # BLD AUTO: 249 K/UL (ref 164–446)
PLATELET BLD QL SMEAR: NORMAL
PMV BLD AUTO: 8.8 FL (ref 9–12.9)
POTASSIUM SERPL-SCNC: 5.2 MMOL/L (ref 3.6–5.5)
PROT SERPL-MCNC: 7.4 G/DL (ref 6–8.2)
PROTEINASE3 AB SER-ACNC: 1 AU/ML (ref 0–19)
QFT TB2 - NIL TBQ2: -0.01 IU/ML
RBC # BLD AUTO: 5.35 M/UL (ref 4.7–6.1)
RBC BLD AUTO: PRESENT
SMUDGE CELLS BLD QL SMEAR: NORMAL
SODIUM SERPL-SCNC: 138 MMOL/L (ref 135–145)
WBC # BLD AUTO: 14.1 K/UL (ref 4.8–10.8)

## 2023-10-24 PROCEDURE — 99232 SBSQ HOSP IP/OBS MODERATE 35: CPT | Performed by: NURSE PRACTITIONER

## 2023-10-24 PROCEDURE — 99239 HOSP IP/OBS DSCHRG MGMT >30: CPT | Performed by: FAMILY MEDICINE

## 2023-10-24 ASSESSMENT — ENCOUNTER SYMPTOMS
VOMITING: 0
NERVOUS/ANXIOUS: 0
SORE THROAT: 0
HEADACHES: 0
COUGH: 0
DIARRHEA: 0
WEAKNESS: 0
FLANK PAIN: 0
INSOMNIA: 0
CONSTIPATION: 0
MYALGIAS: 0
SHORTNESS OF BREATH: 0
BLOOD IN STOOL: 0
FEVER: 0
ABDOMINAL PAIN: 0
FALLS: 0
NAUSEA: 0

## 2023-10-24 ASSESSMENT — PAIN DESCRIPTION - PAIN TYPE: TYPE: ACUTE PAIN

## 2023-10-24 NOTE — CARE PLAN
The patient is Stable - Low risk of patient condition declining or worsening    Shift Goals  Clinical Goals: Liver biopsy results  Patient Goals: Discharge tomorrow  Family Goals: NA    Progress made toward(s) clinical / shift goals:     Problem: Knowledge Deficit - Standard  Goal: Patient and family/care givers will demonstrate understanding of plan of care, disease process/condition, diagnostic tests and medications  Outcome: Progressing     Problem: Gastrointestinal Irritability  Goal: Nausea and vomiting will be absent or improve  Outcome: Progressing     Problem: Pain - Standard  Goal: Alleviation of pain or a reduction in pain to the patient’s comfort goal  Outcome: Progressing

## 2023-10-24 NOTE — DISCHARGE INSTRUCTIONS
Discharge Instructions    Discharged to home by car with relative. Discharged via walking, hospital escort: Refused.  Special equipment needed: Not Applicable    Be sure to schedule a follow-up appointment with your primary care doctor or any specialists as instructed.     Discharge Plan:   Diet Plan: Discussed  Activity Level: Discussed  Confirmed Follow up Appointment: Patient to Call and Schedule Appointment  Confirmed Symptoms Management: Discussed  Medication Reconciliation Updated: Yes  Influenza Vaccine Indication: Indicated: 9 to 64 years of age    I understand that a diet low in cholesterol, fat, and sodium is recommended for good health. Unless I have been given specific instructions below for another diet, I accept this instruction as my diet prescription.   Other diet:     Special Instructions: None    -Is this patient being discharged with medication to prevent blood clots?  No    Is patient discharged on Warfarin / Coumadin?   No     Hepatitis B    Hepatitis B is a liver infection that is caused by a germ (virus). Hepatitis B can spread from person to person. This means it is contagious.  There are two kinds of hepatitis B:  Acute hepatitis B. This may last for 6 months or less.  Long-term, or chronic, hepatitis B. This lasts for more than 6 months. It can lead to serious liver problems.  What are the causes?  This condition is caused by a germ. The germ may spread through:  Contact with the body fluids of someone who has the germ. This includes:  Blood.  Breast milk.  Tears or saliva.  Semen or fluids from the vagina.  Childbirth. A woman can pass the germ to her baby during birth.  What increases the risk?  Having contact with needles or syringes that have the germ on them. This may happen when you:  Inject drugs.  Get a tattoo or body piercing.  Get a treatment that puts thin needles in your skin (acupuncture).  Having sex with someone who has the germ and not using a condom.  Living with or having  close contact with a person who has the condition.  Working in a job where you have contact with blood or body fluids.  Traveling to a country where this condition is common.  Getting treatment to clean your blood (kidney dialysis).  Having been given donated blood (blood transfusion) or an organ transplant.  What are the signs or symptoms?  Not feeling hungry.  A fever.  Feeling very tired.  Feeling like you may vomit or you vomit.  Belly pain.  Dark yellow pee (urine).  Your skin or the white parts of your eyes look yellow (jaundice).  Poop (stool) that is a light color.  Joint pain.  How is this treated?  Treatment may include antiviral medicine. This may help:  Lower your risk of serious liver problems.  Lower the risk you will spread the germ to others.  You will need to avoid taking certain medicines that can lead to liver damage.  Follow these instructions at home:  Medicines  Take over-the-counter and prescription medicines only as told by your doctor.  If you were prescribed an antiviral medicine, take it as told by your doctor. Do not stop taking it even if you start to feel better.  Do not take:  Over-the-counter medicines that have acetaminophen in them.  New medicines unless your doctor says that this is okay. This includes over-the-counter medicines or supplements.  Activity  Rest as needed.  Do not have sex until your doctor says this is okay.  Return to your normal activities as told by your doctor. Ask your doctor what activities are safe for you.  Ask your doctor when you may return to school or work.  Avoid swimming and using hot tubs until your doctor says this is okay.  Eating and drinking         Eat a healthy diet. Eat plenty of:  Fruits and vegetables.  Whole grains.  Low-fat (lean) meats or non-meat proteins, such as beans or tofu.  Drink enough fluids to keep your pee pale yellow.  Do not drink alcohol.  General instructions  Do not share any of these:  Toothbrushes.  Nail  clippers.  Razors.  Wash your hands often with soap and water for at least 20 seconds. If you cannot use soap and water, use hand .  Tell your doctor about people you live with or have close contact with. They may need the hepatitis B vaccine.  Cover any cuts or open sores on your skin.  Follow instructions about how to not spread the germ.  Keep all follow-up visits.  How is this prevented?  Get the hepatitis B vaccine.  If you were exposed to the germ, your doctor may want you to get one of these:  Human immunoglobulin. This fights germs in the body.  The hepatitis B vaccine. Even if you got this vaccine as a child, a booster shot may help.  Wash your hands often with soap and water for at least 20 seconds.  Use a condom every time you have vaginal, oral, or anal sex.  Do not share needles or syringes.  Do not handle blood or body fluids without gloves or other protection.  Avoid getting tattoos or piercings in shops that are not clean.  Where to find more information  Centers for Disease Control and Prevention: www.cdc.gov/hepatitis  World Health Organization: www.who.int  Contact a doctor if:  You get a rash.  Your skin or the whites of your eyes turn yellow or turn more yellow than they were before.  You have a fever or chills.  Get help right away if:  You are not able to eat or drink.  You feel confused.  You have trouble breathing.  You have swelling in any of these areas:  Skin.  Throat.  Mouth.  Face.  Belly.  You have a seizure.  You get very sleepy, or you have trouble waking up.  These symptoms may be an emergency. Get help right away. Call your local emergency services (911 in the [place]  Do not wait to see if the symptoms will go away.  Do not drive yourself to the hospital.  Summary  Hepatitis B is a liver infection that is caused by a germ (virus).  The germ can spread from person to person.  Do not take any new medicines unless your doctor says that this is okay.  Wash your hands often  with soap and water for at least 20 seconds. This helps to prevent infection.  This information is not intended to replace advice given to you by your health care provider. Make sure you discuss any questions you have with your health care provider.  Document Revised: 11/04/2021 Document Reviewed: 11/04/2021  Elsevier Patient Education © 2023 [employer] Inc[employer]

## 2023-10-24 NOTE — DISCHARGE SUMMARY
Discharge Summary    CHIEF COMPLAINT ON ADMISSION  No chief complaint on file.      Reason for Admission  elevated LFTs     Admission Date  10/18/2023    CODE STATUS  Prior    HPI & HOSPITAL COURSE  This is a 22 y.o. male here with acute hepatitis, lymphadenopathy, and leukocytosis.  Oncology and gastroenterology were consulted on the case.  Work-up showed possible acute hepatitis B.  His autoimmune hepatitis work-up so far appears to be negative.  Peripheral flow cytometry also appears to be negative.  Patient did undergo ultrasound-guided liver biopsy on 10/23/2023, pathology results are still pending.  His LFTs have trended down.  His symptoms are better.  Gastroenterology has cleared him for discharge with close follow-up.      Therefore, he is discharged in good and stable condition to home with close outpatient follow-up.    The patient met 2-midnight criteria for an inpatient stay at the time of discharge.    Discharge Date  10/24/2023    FOLLOW UP ITEMS POST DISCHARGE  Follow up with PCP and Gastroenterology    DISCHARGE DIAGNOSES  Principal Problem:    Acute hepatitis (POA: Unknown)  Active Problems:    Splenomegaly (POA: Yes)    Lymphocytosis (POA: Unknown)  Resolved Problems:    * No resolved hospital problems. *      FOLLOW UP  No future appointments.  Ricardo Lindquist, KOSTAN.P.  [address]  [facility] [id number] [phone]    Follow up      [facility]  [address] [facility] [address] [phone]  Follow up      [facility]  [address] [facility] [address] [phone]  Follow up        MEDICATIONS ON DISCHARGE     Medication List      You have not been prescribed any medications.         Allergies  Allergies   Allergen Reactions    Other Environmental Runny Nose     Seasonal allergies, dry, or runny nose, itchy and dry eyes    Acetaminophen Unspecified     Elevated liver enzymes       DIET  No orders of the defined types were placed in  this encounter.      ACTIVITY  As tolerated.  Weight bearing as tolerated    CONSULTATIONS  Oncology  Gastroenterology    PROCEDURES  Ultrasound-guided hepatic parenchymal biopsy  10/23/2023    LABORATORY  Lab Results   Component Value Date    SODIUM 138 10/23/2023    POTASSIUM 5.2 10/23/2023    CHLORIDE 102 10/23/2023    CO2 26 10/23/2023    GLUCOSE 99 10/23/2023    BUN 15 10/23/2023    CREATININE 0.88 10/23/2023        Lab Results   Component Value Date    WBC 14.1 (H) 10/23/2023    HEMOGLOBIN 15.1 10/23/2023    HEMATOCRIT 44.6 10/23/2023    PLATELETCT 249 10/23/2023        Total time of the discharge process exceeds 35 minutes.

## 2023-10-24 NOTE — PROGRESS NOTES
Gastroenterology Progress Note               Author:  KADEN Reynaga Date & Time Created: 10/24/2023 8:00 AM       Patient ID:  Name:             Gene Villauneva    YOB: 2000  Age:                 22 y.o.  male  MRN:               [id number]        Medical Decision Making, by Problem:  Active Hospital Problems    Diagnosis     Acute hepatitis B [B16.9]     Bilirubinemia [E80.6]     Splenomegaly [R16.1]     Acute hepatitis [B17.9]     Lymphocytosis [D72.820]            Presenting Chief Complaint:  HBV IgM +        History of Present Illness:    This is a  22 y.o. male presented to [facility] on October 14 with nausea and vomiting, intermittent fevers, LUQ worse with deep inspiration and his mother noted him to be jaundiced the day prior.  On presentation, his , , alkaline phosphatase 437 and bilirubin 6.3.  He was initially thought to have alcohol induced liver disease but CBC showed elevated WBC and atypical lymphs.  LDH was 650.  MRCP and HIDA results not available but US abd showed spleen 16.5cm otherwise normal.     History notable for recent trip to [place] (Sept 9-Sept 16 and was in the woods for 2 weeks and did have mosquito bites. He 2 days of feeling unwell before he left but definitely had flulike symptoms when he returned.  He was seen in urgent care and given amoxicillin for left ear infection.  Dr. Short was able to elicit a history of male male sexual encounter earlier this month and patient stated it was unprotected oral sex.       Current labs:  WBC 18.3, plt 174, 86.4% lymph, moderate smudge cells  , , , bilirubin 4.2  HAV IgM neg, HBsAg neg, HBcore IgM positive, HCV Ab neg  HIV NR  CT chest/abd/pel with:  mediastinal and right hilar lymph nodes, bilat axillary nodes, hepatomegaly, splenomegaly, tonya hepatis nodes up to 1cm     Interval History:  10/20/2023: Patient seen at bedside.  Reports some abdominal tightness and  nausea if he eats too fast. He states he is tolerating eating more in his meals. He also states he's been told his eyes are looking less yellow. LFt's down trending. Hep B quant pending.     10/23/2023: Patient seen at bedside.  Reports feeling well without nausea, vomiting.  Reports some right upper quadrant tenderness with palpation.  Full cytometry negative for lymphoma.  LFTs continue to downtrend.    10/24/2023: Patient seen and examined.  Reports continued malaise and fatigue, sore throat, and night sweats.  Having normal bowel movements.  Denies nausea or vomiting.  Liver enzymes and T. bili continue to improve.    Hospital Medications:  Current Facility-Administered Medications   Medication Dose Frequency Provider Last Rate Last Admin    ibuprofen (Motrin) tablet 400 mg  400 mg Q6HRS PRN KADEN Lynn   400 mg at 10/21/23 2249    enoxaparin (Lovenox) inj 40 mg  40 mg DAILY AT 1800 Noris Montalvo M.D.   40 mg at 10/22/23 1701    ondansetron (Zofran) syringe/vial injection 4 mg  4 mg Q4HRS PRN Jitendra Del Angel M.D.        ondansetron (Zofran ODT) dispertab 4 mg  4 mg Q4HRS PRN Jitendra Del Angel M.D.        promethazine (Phenergan) tablet 12.5-25 mg  12.5-25 mg Q4HRS PRОЛЬГА Del Angel M.D.        promethazine (Phenergan) suppository 12.5-25 mg  12.5-25 mg Q4HRS PRОЛЬГА Del Angel M.D.        prochlorperazine (Compazine) injection 5-10 mg  5-10 mg Q4HRS PRОЛЬГА Del Angel M.D.       Last reviewed on 10/18/2023  9:18 PM by Tip Garcia R.N.       Review of Systems:  Review of Systems   Constitutional:  Positive for malaise/fatigue. Negative for fever.        Night sweats   HENT:  Negative for congestion and sore throat.    Respiratory:  Negative for cough and shortness of breath.    Cardiovascular:  Negative for chest pain and leg swelling.   Gastrointestinal:  Negative for abdominal pain (Mild right upper quadrant discomfort with palpation), blood in stool, constipation, diarrhea, melena, nausea and vomiting.    Genitourinary:  Negative for dysuria and flank pain.   Musculoskeletal:  Negative for falls and myalgias.   Neurological:  Negative for weakness and headaches.   Psychiatric/Behavioral:  The patient is not nervous/anxious and does not have insomnia.    All other systems reviewed and are negative.        Vital signs:  Weight/BMI: Body mass index is 24.52 kg/m².  /68   Pulse 74   Temp 36.9 °C (98.5 °F) (Temporal)   Resp 16   Wt 71 kg (156 lb 8.4 oz)   SpO2 97%   Vitals:    10/23/23 1415 10/23/23 1448 10/23/23 2001 10/24/23 0447   BP: 121/75 118/63 118/67 120/68   Pulse: 90 76 89 74   Resp: 16 16 18 16   Temp: 36.7 °C (98 °F) 36.8 °C (98.2 °F) 37.2 °C (99 °F) 36.9 °C (98.5 °F)   TempSrc: Temporal Temporal Temporal Temporal   SpO2: 95% 96% 96% 97%   Weight:         Oxygen Therapy:  Pulse Oximetry: 97 %, O2 (LPM): 0, O2 Delivery Device: None - Room Air  No intake or output data in the 24 hours ending 10/24/23 0800        Physical Exam:  Physical Exam  Vitals and nursing note reviewed.   Constitutional:       General: He is awake. He is not in acute distress.     Appearance: He is well-developed, well-groomed and normal weight.   HENT:      Head: Normocephalic.      Nose: Nose normal. No congestion.      Mouth/Throat:      Mouth: Mucous membranes are moist.      Pharynx: Oropharynx is clear. No oropharyngeal exudate.   Eyes:      General: No scleral icterus.     Extraocular Movements: Extraocular movements intact.      Conjunctiva/sclera: Conjunctivae normal.   Neck:      Comments: Palpable cervical adenopathy  Cardiovascular:      Rate and Rhythm: Normal rate and regular rhythm.      Pulses: Normal pulses.      Heart sounds: Normal heart sounds. No murmur heard.  Pulmonary:      Effort: Pulmonary effort is normal. No respiratory distress.      Breath sounds: Normal breath sounds.   Abdominal:      General: Abdomen is flat. Bowel sounds are normal. There is no distension.      Palpations: Abdomen is soft.       Tenderness: There is no abdominal tenderness. There is no guarding.   Musculoskeletal:      Right lower leg: No edema.      Left lower leg: No edema.   Skin:     General: Skin is warm and dry.      Capillary Refill: Capillary refill takes less than 2 seconds.      Coloration: Skin is not jaundiced.   Neurological:      General: No focal deficit present.      Mental Status: He is alert and oriented to person, place, and time. Mental status is at baseline.   Psychiatric:         Mood and Affect: Mood normal.         Behavior: Behavior normal. Behavior is cooperative.         Thought Content: Thought content normal.         Judgment: Judgment normal.             Labs:  Recent Labs     10/22/23  0000 10/23/23  0003 10/23/23  2359   SODIUM 134* 135 138   POTASSIUM 4.1 4.4 5.2   CHLORIDE 101 102 102   CO2 24 26 26   BUN 13 13 15   CREATININE 0.76 0.84 0.88   CALCIUM 8.5 8.6 8.9       Recent Labs     10/22/23  0000 10/23/23  0003 10/23/23  2359   ALTSGPT 280* 282* 293*   ASTSGOT 164* 165* 163*   ALKPHOSPHAT 431* 382* 344*   TBILIRUBIN 1.9* 1.7* 1.7*   GLUCOSE 114* 111* 99       Recent Labs     10/22/23  0000 10/23/23  0003 10/23/23  2359   WBC 17.0* 16.2* 14.1*   NEUTSPOLYS 9.50* 5.30* 10.10*   LYMPHOCYTES 82.60* 86.80* 86.20*   MONOCYTES 6.10 7.90 3.70   EOSINOPHILS 0.90 0.00 0.00   BASOPHILS 0.00 0.00 0.00   ASTSGOT 164* 165* 163*   ALTSGPT 280* 282* 293*   ALKPHOSPHAT 431* 382* 344*   TBILIRUBIN 1.9* 1.7* 1.7*       Recent Labs     10/22/23  0000 10/23/23  0003 10/23/23  2359   RBC 5.13 5.25 5.35   HEMOGLOBIN 14.4 14.7 15.1   HEMATOCRIT 42.2 44.0 44.6   PLATELETCT 220 241 249   PROTHROMBTM 14.6  --   --    INR 1.13  --   --        Recent Results (from the past 24 hour(s))   Histology Request    Collection Time: 10/23/23 11:51 AM   Result Value Ref Range    Pathology Request Sent to Histo    CBC WITH DIFFERENTIAL    Collection Time: 10/23/23 11:59 PM   Result Value Ref Range    WBC 14.1 (H) 4.8 - 10.8 K/uL    RBC 5.35  4.70 - 6.10 M/uL    Hemoglobin 15.1 14.0 - 18.0 g/dL    Hematocrit 44.6 42.0 - 52.0 %    MCV 83.4 81.4 - 97.8 fL    MCH 28.2 27.0 - 33.0 pg    MCHC 33.9 32.3 - 36.5 g/dL    RDW 48.6 35.9 - 50.0 fL    Platelet Count 249 164 - 446 K/uL    MPV 8.8 (L) 9.0 - 12.9 fL    Neutrophils-Polys 10.10 (L) 44.00 - 72.00 %    Lymphocytes 86.20 (H) 22.00 - 41.00 %    Monocytes 3.70 0.00 - 13.40 %    Eosinophils 0.00 0.00 - 6.90 %    Basophils 0.00 0.00 - 1.80 %    Nucleated RBC 0.00 0.00 - 0.20 /100 WBC    Neutrophils (Absolute) 1.42 (L) 1.82 - 7.42 K/uL    Lymphs (Absolute) 12.15 (H) 1.00 - 4.80 K/uL    Monos (Absolute) 0.52 0.00 - 0.85 K/uL    Eos (Absolute) 0.00 0.00 - 0.51 K/uL    Baso (Absolute) 0.00 0.00 - 0.12 K/uL    NRBC (Absolute) 0.00 K/uL    Microcytosis 1+    Comp Metabolic Panel    Collection Time: 10/23/23 11:59 PM   Result Value Ref Range    Sodium 138 135 - 145 mmol/L    Potassium 5.2 3.6 - 5.5 mmol/L    Chloride 102 96 - 112 mmol/L    Co2 26 20 - 33 mmol/L    Anion Gap 10.0 7.0 - 16.0    Glucose 99 65 - 99 mg/dL    Bun 15 8 - 22 mg/dL    Creatinine 0.88 0.50 - 1.40 mg/dL    Calcium 8.9 8.5 - 10.5 mg/dL    Correct Calcium 9.0 8.5 - 10.5 mg/dL    AST(SGOT) 163 (H) 12 - 45 U/L    ALT(SGPT) 293 (H) 2 - 50 U/L    Alkaline Phosphatase 344 (H) 30 - 99 U/L    Total Bilirubin 1.7 (H) 0.1 - 1.5 mg/dL    Albumin 3.9 3.2 - 4.9 g/dL    Total Protein 7.4 6.0 - 8.2 g/dL    Globulin 3.5 1.9 - 3.5 g/dL    A-G Ratio 1.1 g/dL   ESTIMATED GFR    Collection Time: 10/23/23 11:59 PM   Result Value Ref Range    GFR (CKD-EPI) 124 >60 mL/min/1.73 m 2   PLATELET ESTIMATE    Collection Time: 10/23/23 11:59 PM   Result Value Ref Range    Plt Estimation Normal    MORPHOLOGY    Collection Time: 10/23/23 11:59 PM   Result Value Ref Range    RBC Morphology Present     Smudge Cells Moderate    PERIPHERAL SMEAR REVIEW    Collection Time: 10/23/23 11:59 PM   Result Value Ref Range    Peripheral Smear Review see below    DIFFERENTIAL MANUAL     Collection Time: 10/23/23 11:59 PM   Result Value Ref Range    Manual Diff Status PERFORMED        Radiology Review:  CT-NEEDLE BX-LIVER   Final Result      Ultrasound-guided hepatic parenchymal biopsy.      CT-CHEST,ABDOMEN,PELVIS WITH   Final Result         1.  Hepatomegaly   2.  Splenomegaly   3.  Mediastinal, right hilar, tonya hepatis, and mild bilateral axillary adenopathy.            MDM (Data Review):   -Records reviewed and summarized in current documentation  -I personally reviewed and interpreted the laboratory results  -I personally reviewed the radiology images    Assessment/Recommendations:  Impression:   Acute hepatitis B:  hepatitis B core IgM with negative hepatitis B surface antigen--window period   Mixed hepatocellular/cholestatic injury.  Transaminases improved, bilirubin improved but alk phos up some  Hepatosplenomegaly with adenopathy  Lymphocytosis with moderate smudge cells     MDM:  This is a 22-year-old male with a past medical history as listed above who presented to [facility] on 10/18/2023 with acute hepatitis.  He also had notably large lymph nodes and leukocytosis concerning for lymphoma.  Evaluated by oncology team, flow cytometry negative for abnormalities/lymphoma.  During admission, LFTs continue to downtrend.  However, he is noted to have what appears to be recent exposure to hepatitis B.  Given that his LFTs are downtrending, suspect that he is clearing virus itself.  Additionally, he has had mildly elevated autoimmune markers.  These are nonspecific for autoimmune hepatitis but could be inclusive of it.  He has previously had elevated LFTs/evaluation/work-up including Parvez-Barr virus in 2014 but was not checked for autoimmune hepatitis at that time.  Given the severity of patient's transaminitis on arrival as well as mildly elevated autoimmune markers, will proceed with liver biopsy for further evaluation of autoimmune hepatitis.  Additionally, will order  SLA which has high specificity for autoimmune hepatitis.  Patient does not need to remain inpatient for these results.    Plan  Care is supportive in acute hep B and most go on to clear the infection.  Likelihood of fulminant disease is very low so no nucleoside treatment at this time.  Liver biopsy and SLA pending, GI will follow and notify patient of results and any identified treatments  Patient will need outpatient GI follow-up for ongoing management/care-referral placed  Discussed with patient to call both groups in [facility] to establish  Outpatient labs (CBC and CMP) in 1 week with PCP follow-up    Discussed with patient, Dr. Joy, Dr. Bailey    Core Quality Measures   Reviewed items::  Labs, Medications and Radiology reports reviewed

## 2023-10-25 LAB — SOLUBLE LIVER IGG SER IA-ACNC: 4 U (ref 0–24.9)

## 2023-10-26 ENCOUNTER — OFFICE VISIT (OUTPATIENT)
Dept: MEDICAL GROUP | Facility: PHYSICIAN GROUP | Age: 23
End: 2023-10-26
Payer: COMMERCIAL

## 2023-10-26 VITALS
TEMPERATURE: 98.2 F | HEIGHT: 67 IN | OXYGEN SATURATION: 97 % | WEIGHT: 161.2 LBS | SYSTOLIC BLOOD PRESSURE: 100 MMHG | HEART RATE: 91 BPM | BODY MASS INDEX: 25.3 KG/M2 | DIASTOLIC BLOOD PRESSURE: 50 MMHG

## 2023-10-26 DIAGNOSIS — R21 RASH: ICD-10-CM

## 2023-10-26 DIAGNOSIS — Z23 NEED FOR VACCINATION: ICD-10-CM

## 2023-10-26 DIAGNOSIS — B17.9 ACUTE HEPATITIS: ICD-10-CM

## 2023-10-26 DIAGNOSIS — J02.9 SORE THROAT: Primary | ICD-10-CM

## 2023-10-26 LAB — S PYO DNA SPEC NAA+PROBE: NOT DETECTED

## 2023-10-26 PROCEDURE — 99214 OFFICE O/P EST MOD 30 MIN: CPT | Mod: 25

## 2023-10-26 PROCEDURE — 90471 IMMUNIZATION ADMIN: CPT

## 2023-10-26 PROCEDURE — 90686 IIV4 VACC NO PRSV 0.5 ML IM: CPT

## 2023-10-26 PROCEDURE — 3078F DIAST BP <80 MM HG: CPT

## 2023-10-26 PROCEDURE — 87651 STREP A DNA AMP PROBE: CPT

## 2023-10-26 PROCEDURE — 3074F SYST BP LT 130 MM HG: CPT

## 2023-10-26 ASSESSMENT — ENCOUNTER SYMPTOMS
CHILLS: 0
FEVER: 0
NAUSEA: 0
WEAKNESS: 0
MYALGIAS: 0
HEADACHES: 0
ABDOMINAL PAIN: 0
SHORTNESS OF BREATH: 0
CONSTIPATION: 0
WEIGHT LOSS: 0
COUGH: 0
DIARRHEA: 0
DIZZINESS: 0
BLURRED VISION: 0
SORE THROAT: 1
VOMITING: 0

## 2023-10-26 ASSESSMENT — FIBROSIS 4 INDEX: FIB4 SCORE: 0.84

## 2023-10-27 NOTE — ASSESSMENT & PLAN NOTE
Acute, uncomplicated  - Strep negative  - Supportive measures recommended including rest, OTC lozenges, adequate fluid intake, OK to take ibuprofen 400 to 600 mg every 4-6 hours as needed for pain however was educated to avoid Tylenol.

## 2023-10-27 NOTE — ASSESSMENT & PLAN NOTE
Acute condition, seems related overall liver condition. ED precautions advised should symptoms do not resolve or worsens. OK to take OTC benadryl if needed for itching.    REFERRING PROVIDER  Lucinda Renteria NP     POST-OP CLINIC NOTE    SUBJECTIVE:    Parris Guevara is a 59 y.o. year old  patient who is s/p TRH/BSO on 9/12/22.  Final pathology c/w L cystadenofibroma, ROSALBA I, -uterus.    She is eating a regular diet without difficulty. Bowel movements are normal. Pain is controlled.  Vaginal bleeding is normal.  Incision sites are clean, dry, and intact without evidence of infection.    REVIEW OF SYSTEMS  All systems reviewed and negative except as noted in HPI.    OBJECTIVE   Vitals:    10/11/22 1331   BP: (!) 174/80   Pulse: 80      Body mass index is 34.18 kg/m².      1. General: Well appearing, no apparent distress, alert and oriented.  2. Lymph: Neck symmetric without cervical or supraclavicular adenopathy or mass.  3. Lungs: Normal respiratory rate, no accessory muscle use.  4. Psych: Normal affect.  5. Abdomen:  non-distended; incision sites are C/D/I without evidence of infection.  6. Skin: Warm, dry, no rashes or lesions.   7. Extremities: Bilateral lower extremities without edema or tenderness.  8. Genitourinary: Deferred            ASSESSMENT/PLAN:    1. Adnexal mass           Doing well post-operatively.  Operative findings again reviewed. Pathology report discussed.  There is no indication for routine Pap smears.  F/U OB/GYN, Hepatology, Rheumatology.

## 2023-10-27 NOTE — ASSESSMENT & PLAN NOTE
Acute condition, appears to be resolving as liver enzymes seem to be improving  -Labs ordered  -Recommend to continue following up with GI as discussed.  Specialist for GI phone number and address provided.  Strict ED precautions advised such as abdominal pain, nausea, vomiting, diarrhea, and jaundice

## 2023-10-27 NOTE — PROGRESS NOTES
Subjective:     CC: follow-up/labs    HPI:   Gene presents today with    Problem   Rash    Began abruptly 2 days ago  Patient started noticing over inner left arm and chest/upper abdomen     Sore Throat    Patient reports sore throat began 2 days ago  Denies fevers/chills, congestion, but does report a mild cough     Acute Hepatitis    Patient was recently hospitalized for possible acute hepatitis. Patient was discharged with recommendations of close monitoring of labs and referral to GI. Needs follow-up labs such as CBC and CMP per hospitalist request. Overall feeling better today. Denies fevers/chills, night sweats, NV, abdominal pain   Latest Reference Range & Units 10/23/23 23:59   AST(SGOT) 12 - 45 U/L 163 (H)   ALT(SGPT) 2 - 50 U/L 293 (H)   Alkaline Phosphatase 30 - 99 U/L 344 (H)   Total Bilirubin 0.1 - 1.5 mg/dL 1.7 (H)      Latest Reference Range & Units 10/18/23 22:27   Hepatitis A Virus Ab, IgM Non-Reactive  Non-Reactive   Hepatitis B Surface Antigen Non-Reactive  Non-Reactive   Hepatitis B Cors Ab,IgM Non-Reactive  Reactive !   !: Data is abnorma   Latest Reference Range & Units 10/19/23 14:59   HBV (IU/mL) Qnt NAAT IU/mL <10 Detected   HBV (log IU/mL) Qnt NAAT log IU/mL <1.00   HBV Qnt by NAAT Interp Not Detected  Detected !   !: Data is abnormal         ROS:  Review of Systems   Constitutional:  Negative for chills, fever, malaise/fatigue and weight loss.   HENT:  Positive for sore throat.    Eyes:  Negative for blurred vision.   Respiratory:  Negative for cough and shortness of breath.    Cardiovascular:  Negative for chest pain.   Gastrointestinal:  Negative for abdominal pain, constipation, diarrhea, nausea and vomiting.   Musculoskeletal:  Negative for myalgias.   Skin:  Positive for rash.   Neurological:  Negative for dizziness, weakness and headaches.       Objective:     Exam:  /50 (BP Location: Left arm, Patient Position: Sitting, BP Cuff Size: Adult)   Pulse 91   Temp 36.8 °C (98.2  "°F) (Temporal)   Ht 1.702 m (5' 7\")   Wt 73.1 kg (161 lb 3.2 oz)   SpO2 97%   BMI 25.25 kg/m²  Body mass index is 25.25 kg/m².    Physical Exam  Constitutional:       Appearance: Normal appearance.   HENT:      Head: Normocephalic.      Comments: Cervical lymphadenopathy  Eyes:      Conjunctiva/sclera: Conjunctivae normal.      Pupils: Pupils are equal, round, and reactive to light.   Cardiovascular:      Rate and Rhythm: Normal rate and regular rhythm.      Heart sounds: No murmur heard.  Pulmonary:      Effort: Pulmonary effort is normal. No respiratory distress.      Breath sounds: Normal breath sounds.   Abdominal:      Comments: No guarding or tenderness. Negative for hepatomegaly or splenomegaly.   Musculoskeletal:         General: Normal range of motion.   Skin:     General: Skin is warm and dry.      Findings: Rash present.      Comments: Diffused papules over left arm and chest/upper abdomen   Neurological:      General: No focal deficit present.      Mental Status: He is alert and oriented to person, place, and time.   Psychiatric:         Mood and Affect: Mood normal.         Behavior: Behavior normal.       Labs:     Assessment & Plan: Medical Decision Making     22 y.o. male with the following -     Problem List Items Addressed This Visit       Acute hepatitis     Acute condition, appears to be resolving as liver enzymes seem to be improving  -Labs ordered  -Recommend to continue following up with GI as discussed.  Specialist for GI phone number and address provided.  Strict ED precautions advised such as abdominal pain, nausea, vomiting, diarrhea, and jaundice         Relevant Orders    CBC WITH DIFFERENTIAL    Comp Metabolic Panel    Rash     Acute condition, seems related overall liver condition. ED precautions advised should symptoms do not resolve or worsens. OK to take OTC benadryl if needed for itching.          Relevant Orders    CBC WITH DIFFERENTIAL    Comp Metabolic Panel    Sore throat - " Primary     Acute, uncomplicated  - Strep negative  - Supportive measures recommended including rest, OTC lozenges, adequate fluid intake, OK to take ibuprofen 400 to 600 mg every 4-6 hours as needed for pain however was educated to avoid Tylenol.         Relevant Orders    CBC WITH DIFFERENTIAL    POCT CEPHEID GROUP A STREP - PCR (Completed)    Comp Metabolic Panel     Other Visit Diagnoses       Need for vaccination        Relevant Orders    INFLUENZA VACCINE QUAD INJ (PF) (Completed)            Differential diagnosis, natural history, supportive care, and indications for immediate follow-up discussed.  Shared decision making approach utilized, and patient is amendable with plan of care.  Patient understands to return to clinic or go to the emergency department if symptoms worsen. All questions and concerns addressed to the best of my knowledge.    Return in about 4 weeks (around 11/23/2023), or if symptoms worsen or fail to improve.    Please note that this dictation was created using voice recognition software. I have made every reasonable attempt to correct obvious errors, but I expect that there are errors of grammar and possibly content that I did not discover before finalizing the note.

## 2023-10-30 ENCOUNTER — HOSPITAL ENCOUNTER (OUTPATIENT)
Dept: LAB | Facility: MEDICAL CENTER | Age: 23
End: 2023-10-30
Payer: COMMERCIAL

## 2023-10-30 DIAGNOSIS — B17.9 ACUTE HEPATITIS: ICD-10-CM

## 2023-10-30 DIAGNOSIS — R21 RASH: ICD-10-CM

## 2023-10-30 DIAGNOSIS — J02.9 SORE THROAT: ICD-10-CM

## 2023-10-30 LAB
ALBUMIN SERPL BCP-MCNC: 4.5 G/DL (ref 3.2–4.9)
ALBUMIN/GLOB SERPL: 1.3 G/DL
ALP SERPL-CCNC: 201 U/L (ref 30–99)
ALT SERPL-CCNC: 142 U/L (ref 2–50)
ANION GAP SERPL CALC-SCNC: 10 MMOL/L (ref 7–16)
ANISOCYTOSIS BLD QL SMEAR: ABNORMAL
AST SERPL-CCNC: 55 U/L (ref 12–45)
BASOPHILS # BLD AUTO: 0.8 % (ref 0–1.8)
BASOPHILS # BLD: 0.07 K/UL (ref 0–0.12)
BILIRUB SERPL-MCNC: 1.3 MG/DL (ref 0.1–1.5)
BUN SERPL-MCNC: 19 MG/DL (ref 8–22)
BURR CELLS BLD QL SMEAR: NORMAL
CALCIUM ALBUM COR SERPL-MCNC: 9.2 MG/DL (ref 8.5–10.5)
CALCIUM SERPL-MCNC: 9.6 MG/DL (ref 8.5–10.5)
CHLORIDE SERPL-SCNC: 101 MMOL/L (ref 96–112)
CO2 SERPL-SCNC: 24 MMOL/L (ref 20–33)
CREAT SERPL-MCNC: 0.61 MG/DL (ref 0.5–1.4)
EOSINOPHIL # BLD AUTO: 0.08 K/UL (ref 0–0.51)
EOSINOPHIL NFR BLD: 0.9 % (ref 0–6.9)
ERYTHROCYTE [DISTWIDTH] IN BLOOD BY AUTOMATED COUNT: 43.4 FL (ref 35.9–50)
GFR SERPLBLD CREATININE-BSD FMLA CKD-EPI: 138 ML/MIN/1.73 M 2
GLOBULIN SER CALC-MCNC: 3.5 G/DL (ref 1.9–3.5)
GLUCOSE SERPL-MCNC: 94 MG/DL (ref 65–99)
HCT VFR BLD AUTO: 44.6 % (ref 42–52)
HGB BLD-MCNC: 15 G/DL (ref 14–18)
LYMPHOCYTES # BLD AUTO: 5.24 K/UL (ref 1–4.8)
LYMPHOCYTES NFR BLD: 60.2 % (ref 22–41)
MANUAL DIFF BLD: NORMAL
MCH RBC QN AUTO: 27.2 PG (ref 27–33)
MCHC RBC AUTO-ENTMCNC: 33.6 G/DL (ref 32.3–36.5)
MCV RBC AUTO: 80.8 FL (ref 81.4–97.8)
MICROCYTES BLD QL SMEAR: ABNORMAL
MONOCYTES # BLD AUTO: 0.66 K/UL (ref 0–0.85)
MONOCYTES NFR BLD AUTO: 7.6 % (ref 0–13.4)
MORPHOLOGY BLD-IMP: NORMAL
NEUTROPHILS # BLD AUTO: 2.65 K/UL (ref 1.82–7.42)
NEUTROPHILS NFR BLD: 30.5 % (ref 44–72)
NRBC # BLD AUTO: 0 K/UL
NRBC BLD-RTO: 0 /100 WBC (ref 0–0.2)
PLATELET # BLD AUTO: 341 K/UL (ref 164–446)
PLATELET BLD QL SMEAR: NORMAL
PMV BLD AUTO: 8.9 FL (ref 9–12.9)
POIKILOCYTOSIS BLD QL SMEAR: NORMAL
POTASSIUM SERPL-SCNC: 4.3 MMOL/L (ref 3.6–5.5)
PROT SERPL-MCNC: 8 G/DL (ref 6–8.2)
RBC # BLD AUTO: 5.52 M/UL (ref 4.7–6.1)
RBC BLD AUTO: PRESENT
SODIUM SERPL-SCNC: 135 MMOL/L (ref 135–145)
VARIANT LYMPHS BLD QL SMEAR: NORMAL
WBC # BLD AUTO: 8.7 K/UL (ref 4.8–10.8)

## 2023-10-30 PROCEDURE — 85007 BL SMEAR W/DIFF WBC COUNT: CPT

## 2023-10-30 PROCEDURE — 85027 COMPLETE CBC AUTOMATED: CPT

## 2023-10-30 PROCEDURE — 36415 COLL VENOUS BLD VENIPUNCTURE: CPT

## 2023-10-30 PROCEDURE — 80053 COMPREHEN METABOLIC PANEL: CPT

## 2024-01-25 ENCOUNTER — OFFICE VISIT (OUTPATIENT)
Dept: MEDICAL GROUP | Facility: PHYSICIAN GROUP | Age: 24
End: 2024-01-25
Payer: COMMERCIAL

## 2024-01-25 VITALS
DIASTOLIC BLOOD PRESSURE: 50 MMHG | OXYGEN SATURATION: 97 % | HEART RATE: 107 BPM | SYSTOLIC BLOOD PRESSURE: 106 MMHG | BODY MASS INDEX: 28.6 KG/M2 | HEIGHT: 67 IN | WEIGHT: 182.2 LBS | TEMPERATURE: 98.4 F

## 2024-01-25 DIAGNOSIS — B17.9 ACUTE HEPATITIS: ICD-10-CM

## 2024-01-25 DIAGNOSIS — Z86.19 HISTORY OF HEPATITIS B: ICD-10-CM

## 2024-01-25 DIAGNOSIS — J06.9 URTI (ACUTE UPPER RESPIRATORY INFECTION): ICD-10-CM

## 2024-01-25 PROCEDURE — 3078F DIAST BP <80 MM HG: CPT

## 2024-01-25 PROCEDURE — 3074F SYST BP LT 130 MM HG: CPT

## 2024-01-25 PROCEDURE — 99214 OFFICE O/P EST MOD 30 MIN: CPT

## 2024-01-25 ASSESSMENT — ENCOUNTER SYMPTOMS
VOMITING: 0
BLURRED VISION: 0
WEIGHT LOSS: 0
MYALGIAS: 0
FEVER: 0
CONSTIPATION: 0
SHORTNESS OF BREATH: 0
DIARRHEA: 0
SORE THROAT: 1
COUGH: 1
NAUSEA: 0
CHILLS: 0
HEADACHES: 1
ABDOMINAL PAIN: 0
WEAKNESS: 0
DIZZINESS: 0

## 2024-01-25 ASSESSMENT — PATIENT HEALTH QUESTIONNAIRE - PHQ9: CLINICAL INTERPRETATION OF PHQ2 SCORE: 0

## 2024-01-25 ASSESSMENT — FIBROSIS 4 INDEX: FIB4 SCORE: 0.31

## 2024-01-26 NOTE — ASSESSMENT & PLAN NOTE
Acute condition, uncertain resolution  -Labs ordered  -Recommend to continue following up with GI as discussed.  Specialist for GI phone number and address provided at last visit.  They have not called him back.  I have recommended he go down to their office and in person make an appointment

## 2024-01-26 NOTE — ASSESSMENT & PLAN NOTE
Acute, uncomplicated   -Maintain adequate hydration of at least 64oz of water per day, humidifier at bedside, rest   -Okay to take OTC Mucinex 1-2 tablets every 12 hours as needed for cough, Ibuprofen 400-600mg and or Tylenol 500 mg every 8 hours if needed for discomfort  -Follow up in office in 48 hours if not improving or go to ED for chest pain or shortness of breath

## 2024-01-26 NOTE — PROGRESS NOTES
Subjective:     CC: Follow-up for hep B and URTI    HPI:   Gene presents today with    Problem   Urti (Acute Upper Respiratory Infection)    Acute condition  Illness: few days of illness including: nasal congestion, laryngitis, cough. negative sinus pain and pressure:   Symptoms negative for fever, chills, sore throat, dyspnea, wheezing,   No neck stiffness. No mental status changes.  Treatments tried: none   Since onset, symptoms are same   Similarly ill exposures: yes  Medical history negative for asthma  He  reports that he has never smoked. He has never used smokeless tobacco.       History of Hepatitis B    Patient was hospitalized  in October for acute hepatitis. Patient was discharged with recommendations of close monitoring of labs and referral to GI. Needs follow-up labs such as CBC and CMP per hospitalist request. Overall feeling better today. Denies fevers/chills, night sweats, NV, abdominal pain  1/25/24: reports to be feeling better since last seen 10/23/24, avoiding alcohol and taking good care of himself. Eating healthy, not exercising at this time. Would like repeat labs   Latest Reference Range & Units 10/23/23 23:59   AST(SGOT) 12 - 45 U/L 163 (H)   ALT(SGPT) 2 - 50 U/L 293 (H)   Alkaline Phosphatase 30 - 99 U/L 344 (H)   Total Bilirubin 0.1 - 1.5 mg/dL 1.7 (H)      Latest Reference Range & Units 10/18/23 22:27   Hepatitis A Virus Ab, IgM Non-Reactive  Non-Reactive   Hepatitis B Surface Antigen Non-Reactive  Non-Reactive   Hepatitis B Cors Ab,IgM Non-Reactive  Reactive !   !: Data is abnorma   Latest Reference Range & Units 10/19/23 14:59   HBV (IU/mL) Qnt NAAT IU/mL <10 Detected   HBV (log IU/mL) Qnt NAAT log IU/mL <1.00   HBV Qnt by NAAT Interp Not Detected  Detected !   !: Data is abnormal           ROS:  Review of Systems   Constitutional:  Negative for chills, fever, malaise/fatigue and weight loss.   HENT:  Positive for congestion and sore throat (raspy voice).    Eyes:  Negative for blurred  "vision.   Respiratory:  Positive for cough. Negative for shortness of breath.    Cardiovascular:  Negative for chest pain.   Gastrointestinal:  Negative for abdominal pain, constipation, diarrhea, nausea and vomiting.   Musculoskeletal:  Negative for myalgias.   Neurological:  Positive for headaches. Negative for dizziness and weakness.       Objective:     Exam:  /50 (BP Location: Left arm, Patient Position: Sitting, BP Cuff Size: Adult)   Pulse (!) 107   Temp 36.9 °C (98.4 °F) (Temporal)   Ht 1.702 m (5' 7\")   Wt 82.6 kg (182 lb 3.2 oz)   SpO2 97%   BMI 28.54 kg/m²  Body mass index is 28.54 kg/m².    Physical Exam  Constitutional:       Appearance: Normal appearance.   HENT:      Head: Normocephalic.   Eyes:      Conjunctiva/sclera: Conjunctivae normal.      Pupils: Pupils are equal, round, and reactive to light.   Cardiovascular:      Rate and Rhythm: Normal rate and regular rhythm.      Heart sounds: No murmur heard.  Pulmonary:      Effort: Pulmonary effort is normal. No respiratory distress.      Breath sounds: Normal breath sounds.   Musculoskeletal:         General: Normal range of motion.   Skin:     General: Skin is warm and dry.   Neurological:      General: No focal deficit present.      Mental Status: He is alert and oriented to person, place, and time.   Psychiatric:         Mood and Affect: Mood normal.         Behavior: Behavior normal.         Labs: No results found for: \"CHOLSTRLTOT\", \"LDL\", \"HDL\", \"TRIGLYCERIDE\"    Lab Results   Component Value Date/Time    SODIUM 135 10/30/2023 03:30 PM    POTASSIUM 4.3 10/30/2023 03:30 PM    CHLORIDE 101 10/30/2023 03:30 PM    CO2 24 10/30/2023 03:30 PM    GLUCOSE 94 10/30/2023 03:30 PM    BUN 19 10/30/2023 03:30 PM    CREATININE 0.61 10/30/2023 03:30 PM     Lab Results   Component Value Date/Time    ALKPHOSPHAT 201 (H) 10/30/2023 03:30 PM    ASTSGOT 55 (H) 10/30/2023 03:30 PM    ALTSGPT 142 (H) 10/30/2023 03:30 PM    TBILIRUBIN 1.3 10/30/2023 03:30 " "PM      No results found for: \"HBA1C\"  No results found for: \"TSH\"  No results found for: \"FREET4\"  No results found for: \"CBC\"      Assessment & Plan: Medical Decision Making     23 y.o. male with the following -     Problem List Items Addressed This Visit       History of hepatitis B     Acute condition, uncertain resolution  -Labs ordered  -Recommend to continue following up with GI as discussed.  Specialist for GI phone number and address provided at last visit.  They have not called him back.  I have recommended he go down to their office and in person make an appointment         Relevant Orders    CBC WITH DIFFERENTIAL    Comp Metabolic Panel    HBV PCR Quant    HEP BE ANTIBODY    HEP BE ANTIGEN    CBC WITHOUT DIFFERENTIAL    Comp Metabolic Panel    URTI (acute upper respiratory infection)     Acute, uncomplicated   -Maintain adequate hydration of at least 64oz of water per day, humidifier at bedside, rest   -Okay to take OTC Mucinex 1-2 tablets every 12 hours as needed for cough, Ibuprofen 400-600mg and or Tylenol 500 mg every 8 hours if needed for discomfort  -Follow up in office in 48 hours if not improving or go to ED for chest pain or shortness of breath              Differential diagnosis, natural history, supportive care, and indications for immediate follow-up discussed.  Shared decision making approach utilized, and patient is amendable with plan of care.  Patient understands to return to clinic or go to the emergency department if symptoms worsen. All questions and concerns addressed to the best of my knowledge.    Return if symptoms worsen or fail to improve.    Please note that this dictation was created using voice recognition software. I have made every reasonable attempt to correct obvious errors, but I expect that there are errors of grammar and possibly content that I did not discover before finalizing the note.        "

## 2024-02-08 ENCOUNTER — HOSPITAL ENCOUNTER (OUTPATIENT)
Dept: LAB | Facility: MEDICAL CENTER | Age: 24
End: 2024-02-08
Payer: COMMERCIAL

## 2024-02-08 DIAGNOSIS — Z86.19 HISTORY OF HEPATITIS B: ICD-10-CM

## 2024-02-08 DIAGNOSIS — B17.9 ACUTE HEPATITIS: ICD-10-CM

## 2024-02-08 LAB
ALBUMIN SERPL BCP-MCNC: 4.7 G/DL (ref 3.2–4.9)
ALBUMIN/GLOB SERPL: 1.5 G/DL
ALP SERPL-CCNC: 69 U/L (ref 30–99)
ALT SERPL-CCNC: 23 U/L (ref 2–50)
ANION GAP SERPL CALC-SCNC: 14 MMOL/L (ref 7–16)
AST SERPL-CCNC: 20 U/L (ref 12–45)
BILIRUB SERPL-MCNC: 0.5 MG/DL (ref 0.1–1.5)
BUN SERPL-MCNC: 13 MG/DL (ref 8–22)
CALCIUM ALBUM COR SERPL-MCNC: 9.2 MG/DL (ref 8.5–10.5)
CALCIUM SERPL-MCNC: 9.8 MG/DL (ref 8.5–10.5)
CHLORIDE SERPL-SCNC: 102 MMOL/L (ref 96–112)
CO2 SERPL-SCNC: 22 MMOL/L (ref 20–33)
CREAT SERPL-MCNC: 0.94 MG/DL (ref 0.5–1.4)
ERYTHROCYTE [DISTWIDTH] IN BLOOD BY AUTOMATED COUNT: 39.2 FL (ref 35.9–50)
GFR SERPLBLD CREATININE-BSD FMLA CKD-EPI: 117 ML/MIN/1.73 M 2
GLOBULIN SER CALC-MCNC: 3.2 G/DL (ref 1.9–3.5)
GLUCOSE SERPL-MCNC: 95 MG/DL (ref 65–99)
HCT VFR BLD AUTO: 46.5 % (ref 42–52)
HGB BLD-MCNC: 16.5 G/DL (ref 14–18)
MCH RBC QN AUTO: 27.4 PG (ref 27–33)
MCHC RBC AUTO-ENTMCNC: 35.5 G/DL (ref 32.3–36.5)
MCV RBC AUTO: 77.2 FL (ref 81.4–97.8)
PLATELET # BLD AUTO: 283 K/UL (ref 164–446)
PMV BLD AUTO: 9.1 FL (ref 9–12.9)
POTASSIUM SERPL-SCNC: 4 MMOL/L (ref 3.6–5.5)
PROT SERPL-MCNC: 7.9 G/DL (ref 6–8.2)
RBC # BLD AUTO: 6.02 M/UL (ref 4.7–6.1)
SODIUM SERPL-SCNC: 138 MMOL/L (ref 135–145)
WBC # BLD AUTO: 6.7 K/UL (ref 4.8–10.8)

## 2024-02-08 PROCEDURE — 87517 HEPATITIS B DNA QUANT: CPT

## 2024-02-08 PROCEDURE — 86707 HEPATITIS BE ANTIBODY: CPT

## 2024-02-08 PROCEDURE — 87350 HEPATITIS BE AG IA: CPT

## 2024-02-08 PROCEDURE — 36415 COLL VENOUS BLD VENIPUNCTURE: CPT

## 2024-02-08 PROCEDURE — 80053 COMPREHEN METABOLIC PANEL: CPT

## 2024-02-08 PROCEDURE — 85027 COMPLETE CBC AUTOMATED: CPT

## 2024-02-10 LAB
HBV E AB SERPL QL IA: NEGATIVE
HBV E AG SERPL QL IA: NEGATIVE

## 2024-02-11 LAB
HBV DNA SERPL NAA+PROBE-ACNC: NOT DETECTED IU/ML
HBV DNA SERPL NAA+PROBE-LOG IU: NOT DETECTED LOG IU/ML
HBV DNA SERPL QL NAA+PROBE: NOT DETECTED

## 2024-08-07 ENCOUNTER — HOSPITAL ENCOUNTER (OUTPATIENT)
Dept: LAB | Facility: MEDICAL CENTER | Age: 24
End: 2024-08-07
Payer: COMMERCIAL

## 2024-08-07 ENCOUNTER — OFFICE VISIT (OUTPATIENT)
Dept: MEDICAL GROUP | Facility: PHYSICIAN GROUP | Age: 24
End: 2024-08-07
Payer: COMMERCIAL

## 2024-08-07 VITALS
HEIGHT: 67 IN | SYSTOLIC BLOOD PRESSURE: 90 MMHG | OXYGEN SATURATION: 97 % | DIASTOLIC BLOOD PRESSURE: 50 MMHG | TEMPERATURE: 98.8 F | HEART RATE: 83 BPM | BODY MASS INDEX: 27.03 KG/M2 | WEIGHT: 172.2 LBS

## 2024-08-07 DIAGNOSIS — Z11.3 SCREENING EXAMINATION FOR SEXUALLY TRANSMITTED DISEASE: ICD-10-CM

## 2024-08-07 DIAGNOSIS — Z86.19 HISTORY OF HEPATITIS B: ICD-10-CM

## 2024-08-07 DIAGNOSIS — R05.1 ACUTE COUGH: ICD-10-CM

## 2024-08-07 LAB
C TRACH DNA SPEC QL NAA+PROBE: NEGATIVE
FLUAV RNA SPEC QL NAA+PROBE: NEGATIVE
FLUBV RNA SPEC QL NAA+PROBE: NEGATIVE
HBV CORE AB SERPL QL IA: NONREACTIVE
HBV SURFACE AB SERPL IA-ACNC: <3.5 MIU/ML (ref 0–10)
HBV SURFACE AG SER QL: NORMAL
HCV AB SER QL: NORMAL
HIV 1+2 AB+HIV1 P24 AG SERPL QL IA: NORMAL
N GONORRHOEA DNA SPEC QL NAA+PROBE: NEGATIVE
RSV RNA SPEC QL NAA+PROBE: NEGATIVE
SARS-COV-2 RNA RESP QL NAA+PROBE: NEGATIVE
SPECIMEN SOURCE: NORMAL
T PALLIDUM AB SER QL IA: NORMAL

## 2024-08-07 PROCEDURE — 87591 N.GONORRHOEAE DNA AMP PROB: CPT

## 2024-08-07 PROCEDURE — 87491 CHLMYD TRACH DNA AMP PROBE: CPT

## 2024-08-07 PROCEDURE — 86706 HEP B SURFACE ANTIBODY: CPT

## 2024-08-07 PROCEDURE — 99214 OFFICE O/P EST MOD 30 MIN: CPT

## 2024-08-07 PROCEDURE — 86803 HEPATITIS C AB TEST: CPT

## 2024-08-07 PROCEDURE — 86780 TREPONEMA PALLIDUM: CPT

## 2024-08-07 PROCEDURE — 3074F SYST BP LT 130 MM HG: CPT

## 2024-08-07 PROCEDURE — 0241U POCT CEPHEID COV-2, FLU A/B, RSV - PCR: CPT

## 2024-08-07 PROCEDURE — 87340 HEPATITIS B SURFACE AG IA: CPT

## 2024-08-07 PROCEDURE — 86704 HEP B CORE ANTIBODY TOTAL: CPT

## 2024-08-07 PROCEDURE — 36415 COLL VENOUS BLD VENIPUNCTURE: CPT

## 2024-08-07 PROCEDURE — 87661 TRICHOMONAS VAGINALIS AMPLIF: CPT

## 2024-08-07 PROCEDURE — 3078F DIAST BP <80 MM HG: CPT

## 2024-08-07 PROCEDURE — 87389 HIV-1 AG W/HIV-1&-2 AB AG IA: CPT

## 2024-08-07 ASSESSMENT — ENCOUNTER SYMPTOMS
INSOMNIA: 1
CHILLS: 0
DIARRHEA: 1
SHORTNESS OF BREATH: 0
NERVOUS/ANXIOUS: 1
HEADACHES: 0
ABDOMINAL PAIN: 0
WEAKNESS: 0
BLURRED VISION: 0
FEVER: 0
COUGH: 0
NAUSEA: 0
CONSTIPATION: 0
VOMITING: 0
DEPRESSION: 0
DIZZINESS: 0
WEIGHT LOSS: 0
MYALGIAS: 0

## 2024-08-07 ASSESSMENT — FIBROSIS 4 INDEX: FIB4 SCORE: 0.34

## 2024-08-07 NOTE — PROGRESS NOTES
Verbal consent was acquired by the patient to use [employer] ambient listening note generation during this visit  Subjective:     CC: insomnia    HPI:   Gene presents today with  History of Present Illness  The patient presents for evaluation of multiple medical concerns.    He is experiencing insomnia, which is a new symptom for him. His sleep is generally good, typically retiring at 8:00 p.m. and waking up at 4:00 a.m.    He reports a loss of appetite, with only a single hamburger and a handful of potato chips consumed within the last 48 hours. Despite feeling hungry, he struggles to eat immediately after returning home. He denies experiencing nausea or vomiting. He began experiencing diarrhea today, but denies any stomach upset. His bowel movements have been solid since yesterday.    He has been experiencing insomnia for the past 3 days. He felt fatigued on Monday, which lasted until 2:00 a.m., after which he took a 2-hour nap and went to work. Since then, he has been unable to sleep. This is his first episode of insomnia. He took a week off from vacation and returned to work on Monday, but on Tuesday, he was unable to sleep and felt as if he was going to faint. He fell asleep at 6:00 a.m. and woke up at 4:00 p.m. He has not tried any sleep aids. He also reports a loss of train of thought while talking. 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    He is experiencing a loss of taste and smell, a mild cough, and cold sweats. He suspects he may have contracted COVID-19 at work. He denies any abdominal pain. He noticed a slight soreness in his liver about a month ago, but this has since resolved. He still has his appendix and gallbladder.    Supplemental Information  He went to the gastroenterologist who gave him a list of labs. He has an appointment scheduled to go back there at the end of 09/2024, but he has not done any of those tests yet.  "Dr. Paresh Huang ordered an ultrasound of his abdomen.    SOCIAL HISTORY  He works in a warehouse.      No problems updated.      ROS:  Review of Systems   Constitutional:  Positive for malaise/fatigue. Negative for chills, fever and weight loss.   Eyes:  Negative for blurred vision.   Respiratory:  Negative for cough and shortness of breath.    Cardiovascular:  Negative for chest pain.   Gastrointestinal:  Positive for diarrhea. Negative for abdominal pain, constipation, nausea and vomiting.   Musculoskeletal:  Negative for myalgias.   Neurological:  Negative for dizziness, weakness and headaches.   Psychiatric/Behavioral:  Negative for depression. The patient is nervous/anxious and has insomnia.        Objective:     Exam:  BP 90/50 (BP Location: Left arm, Patient Position: Standing, BP Cuff Size: Adult)   Pulse 83   Temp 37.1 °C (98.8 °F) (Temporal)   Ht 1.702 m (5' 7\")   Wt 78.1 kg (172 lb 3.2 oz)   SpO2 97%   BMI 26.97 kg/m²  Body mass index is 26.97 kg/m².    Physical Exam  Constitutional:       General: He is not in acute distress.     Appearance: Normal appearance. He is normal weight. He is not ill-appearing or toxic-appearing.   HENT:      Head: Normocephalic and atraumatic.      Right Ear: Tympanic membrane normal.      Left Ear: Tympanic membrane normal.      Nose: Nose normal.      Mouth/Throat:      Mouth: Mucous membranes are moist.      Pharynx: Oropharynx is clear. No posterior oropharyngeal erythema.   Eyes:      Extraocular Movements: Extraocular movements intact.      Conjunctiva/sclera: Conjunctivae normal.      Pupils: Pupils are equal, round, and reactive to light.   Cardiovascular:      Rate and Rhythm: Normal rate and regular rhythm.      Pulses: Normal pulses.      Heart sounds: Normal heart sounds. No murmur heard.     No friction rub. No gallop.   Pulmonary:      Effort: Pulmonary effort is normal. No respiratory distress.      Breath sounds: Normal breath sounds.   Abdominal:      " General: Bowel sounds are normal. There is no distension.      Palpations: Abdomen is soft. There is no mass.      Tenderness: There is no abdominal tenderness. There is no guarding or rebound.   Musculoskeletal:         General: Normal range of motion.      Cervical back: Normal range of motion and neck supple.   Lymphadenopathy:      Cervical: No cervical adenopathy.   Skin:     General: Skin is warm and dry.      Capillary Refill: Capillary refill takes less than 2 seconds.   Neurological:      General: No focal deficit present.      Mental Status: He is alert and oriented to person, place, and time.   Psychiatric:         Mood and Affect: Mood normal.         Behavior: Behavior normal.         Labs:   No visits with results within 1 Month(s) from this visit.   Latest known visit with results is:   Hospital Outpatient Visit on 02/08/2024   Component Date Value    Sodium 02/08/2024 138     Potassium 02/08/2024 4.0     Chloride 02/08/2024 102     Co2 02/08/2024 22     Anion Gap 02/08/2024 14.0     Glucose 02/08/2024 95     Bun 02/08/2024 13     Creatinine 02/08/2024 0.94     Calcium 02/08/2024 9.8     Correct Calcium 02/08/2024 9.2     AST(SGOT) 02/08/2024 20     ALT(SGPT) 02/08/2024 23     Alkaline Phosphatase 02/08/2024 69     Total Bilirubin 02/08/2024 0.5     Albumin 02/08/2024 4.7     Total Protein 02/08/2024 7.9     Globulin 02/08/2024 3.2     A-G Ratio 02/08/2024 1.5     WBC 02/08/2024 6.7     RBC 02/08/2024 6.02     Hemoglobin 02/08/2024 16.5     Hematocrit 02/08/2024 46.5     MCV 02/08/2024 77.2 (L)     MCH 02/08/2024 27.4     MCHC 02/08/2024 35.5     RDW 02/08/2024 39.2     Platelet Count 02/08/2024 283     MPV 02/08/2024 9.1     Hepatitis Be Antigen 02/08/2024 Negative     Hepatitis BE Antibody 02/08/2024 Negative     HBV (IU/mL) Qnt NAAT 02/08/2024 Not Detected     HBV (log IU/mL) Qnt NAAT 02/08/2024 Not Detected     HBV Qnt by NAAT Interp 02/08/2024 Not Detected     GFR (CKD-EPI) 02/08/2024 117         Assessment & Plan: Medical Decision Making     23 y.o. male with the following -   Assessment & Plan  1.  History of Hepatitis B.  The hepatitis panel will be cancelled due to the presence of an STI.    2. Insomnia.  Anxiety appears to be the primary cause of his insomnia. Over-the-counter sleep aids, such as melatonin, magnesium glycinate, Sleepy Time tea, and ashwagandha, were suggested. If these prove ineffective, a prescription for trazodone will be considered.    3. Suspected COVID-19.  A swab will be taken for COVID-19 testing. A note will be provided if his COVID-19 test returns positive, allowing him to return to work on Monday.  Acute, uncomplicated   -Maintain adequate hydration of at least 64oz of water per day, humidifier at bedside, rest   -Okay to take OTC Mucinex 1-2 tablets every 12 hours as needed for cough, Ibuprofen 400-600mg and or Tylenol 500 mg every 8 hours if needed for discomfort  -Follow up in office in 48 hours if not improving or go to ED for chest pain or shortness of breath    4. Health maintenance.  He is due for his second HPV vaccine. The second HPV vaccine will be administered at a later date.    Problem List Items Addressed This Visit       History of hepatitis B    Relevant Orders    Chlamydia/GC, PCR (Urine)    HIV AG/AB Combo Assay Screening    T.Pallidum AB ENRIQUE (Screening)    Trichomonas Vaginalis by TMA    Hepatitis C Virus Antibody    HEP B Surface Antibody    Hep B Core AB Total    Hep B Surface Antigen     Other Visit Diagnoses       Acute cough        Relevant Orders    POCT CEPHEID COV-2, FLU A/B, RSV - PCR    Screening examination for sexually transmitted disease        Relevant Orders    Chlamydia/GC, PCR (Urine)    HIV AG/AB Combo Assay Screening    T.Pallidum AB ENRIQUE (Screening)    Trichomonas Vaginalis by TMA    Hepatitis C Virus Antibody    HEP B Surface Antibody    Hep B Core AB Total    Hep B Surface Antigen            Differential diagnosis, natural history,  supportive care, and indications for immediate follow-up discussed.  Shared decision making approach utilized, and patient is amendable with plan of care.  Patient understands to return to clinic or go to the emergency department if symptoms worsen. All questions and concerns addressed to the best of my knowledge.    Return if symptoms worsen or fail to improve.    Please note that this dictation was created using voice recognition software. I have made every reasonable attempt to correct obvious errors, but I expect that there are errors of grammar and possibly content that I did not discover before finalizing the note.

## 2024-08-07 NOTE — LETTER
[address]  [facility]  [address]  [address]  [place] [address]     August 7, 2024    Patient: Gene Villanueva   YOB: 2000   Date of Visit: 8/7/2024       To Whom It May Concern:    Gene Villanueva was seen and treated in our department on 8/7/2024. He may return to work 8/12/24 as he has been ill.     Sincerely,     Ricardo Lindquist D.N.P.

## 2024-08-09 LAB
SPEC CONTAINER SPEC: NORMAL
SPECIMEN SOURCE: NORMAL
T VAGINALIS RRNA SPEC QL NAA+PROBE: NEGATIVE

## 2025-02-21 ENCOUNTER — OFFICE VISIT (OUTPATIENT)
Dept: MEDICAL GROUP | Facility: MEDICAL CENTER | Age: 25
End: 2025-02-21
Payer: COMMERCIAL

## 2025-02-21 VITALS
TEMPERATURE: 98.3 F | HEART RATE: 73 BPM | SYSTOLIC BLOOD PRESSURE: 98 MMHG | BODY MASS INDEX: 25.99 KG/M2 | DIASTOLIC BLOOD PRESSURE: 40 MMHG | HEIGHT: 67 IN | OXYGEN SATURATION: 98 % | WEIGHT: 165.6 LBS

## 2025-02-21 DIAGNOSIS — R09.81 NASAL CONGESTION: ICD-10-CM

## 2025-02-21 DIAGNOSIS — J02.9 SORE THROAT: ICD-10-CM

## 2025-02-21 PROCEDURE — 3078F DIAST BP <80 MM HG: CPT | Performed by: FAMILY MEDICINE

## 2025-02-21 PROCEDURE — 3074F SYST BP LT 130 MM HG: CPT | Performed by: FAMILY MEDICINE

## 2025-02-21 PROCEDURE — 99213 OFFICE O/P EST LOW 20 MIN: CPT | Performed by: FAMILY MEDICINE

## 2025-02-21 RX ORDER — PREDNISONE 20 MG/1
TABLET ORAL
Qty: 15 TABLET | Refills: 0 | Status: SHIPPED | OUTPATIENT
Start: 2025-02-21 | End: 2025-02-28

## 2025-02-21 ASSESSMENT — PATIENT HEALTH QUESTIONNAIRE - PHQ9: CLINICAL INTERPRETATION OF PHQ2 SCORE: 0

## 2025-02-21 ASSESSMENT — FIBROSIS 4 INDEX: FIB4 SCORE: 0.35

## 2025-02-21 NOTE — PROGRESS NOTES
Verbal consent was acquired by the patient to use [employer] ambient listening note generation during this visit:  Yes      Chief complaint::Diagnoses of Sore throat and Nasal congestion were pertinent to this visit.    Assessment and Plan:   The following treatment plan was discussed:     Assessment & Plan  1. Viral URI: Acute. Suspected viral etiology; antibiotics not warranted. Exam: left turbinate swelling, mild right turbinate swelling, posterior oropharynx erythema with cobblestoning, no sinus tenderness, normal ears.  - Flonase or Nasacort with Sudafed multisystem medication  - Antihistamines (Claritin, Zyrtec, Allegra) for itchy ears  - Hydrocortisone topical on Q-tip for ear irritation  - Recommend fluids and steam inhalation  - Prescribed 7-day steroids    Follow-up  - Work note provided  Gene was seen today for sinus problem and body aches.    Diagnoses and all orders for this visit:    Sore throat  -     Cancel: POCT CoV-2, Flu A/B, RSV by PCR  -     predniSONE (DELTASONE) 20 MG Tab; Take 3 Tablets by mouth every day for 3 days, THEN 2 Tablets every day for 2 days, THEN 1 Tablet every day for 2 days.    Nasal congestion  -     Cancel: POCT CoV-2, Flu A/B, RSV by PCR  -     predniSONE (DELTASONE) 20 MG Tab; Take 3 Tablets by mouth every day for 3 days, THEN 2 Tablets every day for 2 days, THEN 1 Tablet every day for 2 days.        Followup: Return if symptoms worsen or fail to improve.    Subjective/HPI:     HPI:    Gene Villanueva is a pleasant 24 y.o. male here for   Chief Complaint   Patient presents with    Sinus Problem     X 1 week    Body Aches     Mild aches, dry ears, X  1 week        History of Present Illness  24-year-old individual with 1 week of sinus pain, pressure, congestion, and cough.    Symptoms began 1 week ago with ear dryness and itchiness, worsening to sore throat and painful swallowing by Wednesday, causing time off work. Currently has sore throat, nasal congestion, clogged ears  "(hearing unaffected), mild cough, and resolved transient body aches. Parents have been ill for a month, but they do not believe they contracted their illness. Suspects sinus infection, not COVID-19. Occasional Advil for symptom management.    MEDICATIONS  Advil    Current Medicines (including changes today)  Current Outpatient Medications   Medication Sig Dispense Refill    predniSONE (DELTASONE) 20 MG Tab Take 3 Tablets by mouth every day for 3 days, THEN 2 Tablets every day for 2 days, THEN 1 Tablet every day for 2 days. 15 Tablet 0     No current facility-administered medications for this visit.     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  He  has a past surgical history that includes extensor tendon repair (4/4/2014).       Objective:   BP 98/40 (BP Location: Right arm, Patient Position: Sitting, BP Cuff Size: Adult)   Pulse 73   Temp 36.8 °C (98.3 °F) (Temporal)   Ht 1.702 m (5' 7\")   Wt 75.1 kg (165 lb 9.6 oz)   SpO2 98%  Body mass index is 25.94 kg/m².    Physical Exam  Constitutional:       General: He is not in acute distress.     Appearance: He is not ill-appearing or toxic-appearing.   HENT:      Head: Normocephalic.      Right Ear: Tympanic membrane and external ear normal.      Left Ear: Tympanic membrane and external ear normal.      Nose: Nose normal. No congestion or rhinorrhea.      Right Turbinates: Swollen.      Left Turbinates: Enlarged and swollen.      Right Sinus: No maxillary sinus tenderness or frontal sinus tenderness.      Left Sinus: No maxillary sinus tenderness or frontal sinus tenderness.      Mouth/Throat:      Mouth: Mucous membranes are moist.      Pharynx: Oropharynx is clear. Posterior oropharyngeal erythema and postnasal drip present.   Eyes:      General:         Right eye: No " discharge.         Left eye: No discharge.      Conjunctiva/sclera: Conjunctivae normal.      Pupils: Pupils are equal, round, and reactive to light.   Cardiovascular:      Rate and Rhythm: Normal rate and regular rhythm.      Heart sounds: No murmur heard.  Pulmonary:      Effort: Pulmonary effort is normal. No respiratory distress.      Breath sounds: Normal breath sounds. No wheezing.   Abdominal:      General: Abdomen is flat.   Musculoskeletal:         General: No swelling or tenderness.      Cervical back: Normal range of motion and neck supple.      Right lower leg: No edema.      Left lower leg: No edema.   Lymphadenopathy:      Cervical: No cervical adenopathy.   Skin:     General: Skin is warm.   Neurological:      General: No focal deficit present.      Mental Status: He is alert and oriented to person, place, and time. Mental status is at baseline.   Psychiatric:         Mood and Affect: Mood normal.          Lab/ Imaging Results:  No labs or imaging to review    Please note that this dictation was created using voice recognition software. I have made every reasonable attempt to correct obvious errors, but I expect that there are errors of grammar and possibly content that I did not discover before finalizing the note.

## 2025-02-21 NOTE — LETTER
February 21, 2025    To Whom It May Concern:         This is confirmation that Gene Villanueva attended his scheduled appointment with KADEN Lopes on 2/21/25.  Please excuse Gene from work starting 02/19/2025, okay to return to work on Monday 02/24/2025.         If you have any questions please do not hesitate to call me at the phone number listed below.    Sincerely,          Devi Arriaga A.P.R.N.  [phone]